# Patient Record
Sex: FEMALE | Race: WHITE | NOT HISPANIC OR LATINO | Employment: OTHER | URBAN - METROPOLITAN AREA
[De-identification: names, ages, dates, MRNs, and addresses within clinical notes are randomized per-mention and may not be internally consistent; named-entity substitution may affect disease eponyms.]

---

## 2017-05-14 LAB
ALBUMIN SERPL-MCNC: 4.4 G/DL (ref 3.5–5.5)
ALBUMIN/GLOB SERPL: 2 {RATIO} (ref 1.2–2.2)
ALP SERPL-CCNC: 93 IU/L (ref 39–117)
ALT SERPL-CCNC: 19 IU/L (ref 0–32)
AST SERPL-CCNC: 25 IU/L (ref 0–40)
BASOPHILS # BLD AUTO: 0.1 X10E3/UL (ref 0–0.2)
BASOPHILS NFR BLD AUTO: 1 %
BILIRUB SERPL-MCNC: 0.5 MG/DL (ref 0–1.2)
BUN SERPL-MCNC: 13 MG/DL (ref 6–24)
BUN/CREAT SERPL: 16 (ref 9–23)
CALCIUM SERPL-MCNC: 9.7 MG/DL (ref 8.7–10.2)
CHLORIDE SERPL-SCNC: 106 MMOL/L (ref 96–106)
CHOLEST SERPL-MCNC: 218 MG/DL (ref 100–199)
CO2 SERPL-SCNC: 24 MMOL/L (ref 18–29)
COMMENT 011824: ABNORMAL
CREAT SERPL-MCNC: 0.82 MG/DL (ref 0.57–1)
EOSINOPHIL # BLD AUTO: 0.4 X10E3/UL (ref 0–0.4)
EOSINOPHIL NFR BLD AUTO: 7 %
ERYTHROCYTE [DISTWIDTH] IN BLOOD BY AUTOMATED COUNT: 13.5 % (ref 12.3–15.4)
GLOBULIN SER CALC-MCNC: 2.2 G/DL (ref 1.5–4.5)
GLUCOSE SERPL-MCNC: 93 MG/DL (ref 65–99)
HCT VFR BLD AUTO: 43.3 % (ref 34–46.6)
HDLC SERPL-MCNC: 86 MG/DL
HGB BLD-MCNC: 14.6 G/DL (ref 11.1–15.9)
IMM GRANULOCYTES # BLD: 0 X10E3/UL (ref 0–0.1)
IMM GRANULOCYTES NFR BLD: 0 %
IMMATURE CELLS  115398: NORMAL
LDLC SERPL CALC-MCNC: 100 MG/DL (ref 0–99)
LYMPHOCYTES # BLD AUTO: 2.1 X10E3/UL (ref 0.7–3.1)
LYMPHOCYTES NFR BLD AUTO: 37 %
MCH RBC QN AUTO: 31.7 PG (ref 26.6–33)
MCHC RBC AUTO-ENTMCNC: 33.7 G/DL (ref 31.5–35.7)
MCV RBC AUTO: 94 FL (ref 79–97)
MONOCYTES # BLD AUTO: 0.4 X10E3/UL (ref 0.1–0.9)
MONOCYTES NFR BLD AUTO: 7 %
MORPHOLOGY BLD-IMP: NORMAL
NEUTROPHILS # BLD AUTO: 2.7 X10E3/UL (ref 1.4–7)
NEUTROPHILS NFR BLD AUTO: 48 %
NRBC BLD AUTO-RTO: NORMAL %
PLATELET # BLD AUTO: 302 X10E3/UL (ref 150–379)
POTASSIUM SERPL-SCNC: 4.7 MMOL/L (ref 3.5–5.2)
PROT SERPL-MCNC: 6.6 G/DL (ref 6–8.5)
RBC # BLD AUTO: 4.6 X10E6/UL (ref 3.77–5.28)
SODIUM SERPL-SCNC: 144 MMOL/L (ref 134–144)
TRIGL SERPL-MCNC: 161 MG/DL (ref 0–149)
VLDLC SERPL CALC-MCNC: 32 MG/DL (ref 5–40)
WBC # BLD AUTO: 5.7 X10E3/UL (ref 3.4–10.8)

## 2017-05-18 LAB — HEMOCCULT STL QL IA: NEGATIVE

## 2017-05-31 ENCOUNTER — OFFICE VISIT (OUTPATIENT)
Dept: MEDICAL GROUP | Age: 57
End: 2017-05-31
Payer: COMMERCIAL

## 2017-05-31 VITALS
BODY MASS INDEX: 26.29 KG/M2 | OXYGEN SATURATION: 97 % | HEART RATE: 71 BPM | SYSTOLIC BLOOD PRESSURE: 122 MMHG | WEIGHT: 154 LBS | TEMPERATURE: 99 F | DIASTOLIC BLOOD PRESSURE: 78 MMHG | HEIGHT: 64 IN

## 2017-05-31 DIAGNOSIS — K90.0 CELIAC DISEASE: ICD-10-CM

## 2017-05-31 DIAGNOSIS — Z12.31 ENCOUNTER FOR SCREENING MAMMOGRAM FOR BREAST CANCER: ICD-10-CM

## 2017-05-31 DIAGNOSIS — E78.2 MIXED HYPERLIPIDEMIA: ICD-10-CM

## 2017-05-31 PROCEDURE — 99214 OFFICE O/P EST MOD 30 MIN: CPT | Performed by: INTERNAL MEDICINE

## 2017-05-31 ASSESSMENT — ENCOUNTER SYMPTOMS
MUSCULOSKELETAL NEGATIVE: 1
RESPIRATORY NEGATIVE: 1
CONSTITUTIONAL NEGATIVE: 1
NEUROLOGICAL NEGATIVE: 1
CARDIOVASCULAR NEGATIVE: 1
GASTROINTESTINAL NEGATIVE: 1
PSYCHIATRIC NEGATIVE: 1
EYES NEGATIVE: 1

## 2017-05-31 ASSESSMENT — PATIENT HEALTH QUESTIONNAIRE - PHQ9: CLINICAL INTERPRETATION OF PHQ2 SCORE: 0

## 2017-05-31 NOTE — PROGRESS NOTES
"Subjective:      Isabella Strauss is a 56 y.o. female who presents with Celiac Disease  The patient is here for followup of chronic medical problems listed below. The patient is compliant with medications and having no side effects from them. Denies chest pain, abdominal pain, dyspnea, myalgias, or cough.   Patient Active Problem List    Diagnosis Date Noted   • Family history of colon cancer- in 2 great paternal uncles 05/23/2016   • Mixed hyperlipidemia- mild no meds; hdl> 60 05/23/2016   • Celiac disease 10/08/2013     Allergies   Allergen Reactions   • Ivp Dye [Iodine] Anaphylaxis     No outpatient prescriptions prior to visit.     No facility-administered medications prior to visit.               HPI    Review of Systems   Constitutional: Negative.    HENT: Negative.    Eyes: Negative.    Respiratory: Negative.    Cardiovascular: Negative.    Gastrointestinal: Negative.    Genitourinary: Negative.    Musculoskeletal: Negative.    Skin: Negative.    Neurological: Negative.    Endo/Heme/Allergies: Negative.    Psychiatric/Behavioral: Negative.           Objective:     /78 mmHg  Pulse 71  Temp(Src) 37.2 °C (99 °F)  Ht 1.638 m (5' 4.49\")  Wt 69.854 kg (154 lb)  BMI 26.04 kg/m2  SpO2 97%     Physical Exam   Constitutional: She is oriented to person, place, and time. She appears well-developed and well-nourished.   HENT:   Head: Normocephalic and atraumatic.   Right Ear: External ear normal.   Left Ear: External ear normal.   Nose: Nose normal.   Mouth/Throat: Oropharynx is clear and moist.   Eyes: Conjunctivae and EOM are normal. Pupils are equal, round, and reactive to light. Right eye exhibits no discharge. Left eye exhibits no discharge. No scleral icterus.   Neck: Normal range of motion. Neck supple. No JVD present. No tracheal deviation present. No thyromegaly present.   Cardiovascular: Normal rate, regular rhythm, normal heart sounds and intact distal pulses.  Exam reveals no gallop and no friction " rub.    Pulmonary/Chest: Effort normal and breath sounds normal. No stridor. No respiratory distress. She has no wheezes. She has no rales. She exhibits no tenderness.   Abdominal: Soft. Bowel sounds are normal. She exhibits no distension and no mass. There is no tenderness. There is no rebound and no guarding. No hernia.   Musculoskeletal: Normal range of motion. She exhibits no edema or tenderness.   Lymphadenopathy:     She has no cervical adenopathy.   Neurological: She is alert and oriented to person, place, and time. She has normal reflexes. She displays normal reflexes. No cranial nerve deficit. Coordination normal.   Skin: Skin is warm and dry. No rash noted. No erythema. No pallor.   Psychiatric: She has a normal mood and affect. Her behavior is normal. Judgment and thought content normal.   Nursing note and vitals reviewed.    Orders Only on 05/13/2017   Component Date Value   • WBC 05/13/2017 5.7    • RBC 05/13/2017 4.60    • Hemoglobin 05/13/2017 14.6    • Hematocrit 05/13/2017 43.3    • MCV 05/13/2017 94    • MCH 05/13/2017 31.7    • MCHC 05/13/2017 33.7    • RDW 05/13/2017 13.5    • Platelet Count 05/13/2017 302    • Neutrophils-Polys 05/13/2017 48    • Lymphocytes 05/13/2017 37    • Monocytes 05/13/2017 7    • Eosinophils 05/13/2017 7    • Basophils 05/13/2017 1    • Immature Cells 05/13/2017 CANCELED    • Neutrophils (Absolute) 05/13/2017 2.7    • Lymphs (Absolute) 05/13/2017 2.1    • Monos (Absolute) 05/13/2017 0.4    • Eos (Absolute) 05/13/2017 0.4    • Baso (Absolute) 05/13/2017 0.1    • Immature Granulocytes 05/13/2017 0    • Immature Granulocytes (a* 05/13/2017 0.0    • Nucleated RBC 05/13/2017 CANCELED    • Comments-Diff 05/13/2017 CANCELED    • Glucose 05/13/2017 93    • Bun 05/13/2017 13    • Creatinine 05/13/2017 0.82    • GFR If Non  Ameri* 05/13/2017 80    • GFR If  05/13/2017 93    • Bun-Creatinine Ratio 05/13/2017 16    • Sodium 05/13/2017 144    • Potassium  05/13/2017 4.7    • Chloride 05/13/2017 106    • Co2 05/13/2017 24    • Calcium 05/13/2017 9.7    • Total Protein 05/13/2017 6.6    • Albumin 05/13/2017 4.4    • Globulin 05/13/2017 2.2    • A-G Ratio 05/13/2017 2.0    • Total Bilirubin 05/13/2017 0.5    • Alkaline Phosphatase 05/13/2017 93    • AST(SGOT) 05/13/2017 25    • ALT(SGPT) 05/13/2017 19    • Cholesterol,Tot 05/13/2017 218*   • Triglycerides 05/13/2017 161*   • HDL 05/13/2017 86    • VLDL Cholesterol Calc 05/13/2017 32    • LDL 05/13/2017 100*   • Comment: 05/13/2017 CANCELED    • Occult Blood, IA 05/15/2017 Negative       No results found for: HBA1C  Lab Results   Component Value Date/Time    SODIUM 144 05/13/2017 07:25 AM    POTASSIUM 4.7 05/13/2017 07:25 AM    CHLORIDE 106 05/13/2017 07:25 AM    CO2 24 05/13/2017 07:25 AM    GLUCOSE 93 05/13/2017 07:25 AM    BUN 13 05/13/2017 07:25 AM    CREATININE 0.82 05/13/2017 07:25 AM    BUN-CREATININE RATIO 16 05/13/2017 07:25 AM    ALKALINE PHOSPHATASE 93 05/13/2017 07:25 AM    AST(SGOT) 25 05/13/2017 07:25 AM    ALT(SGPT) 19 05/13/2017 07:25 AM    TOTAL BILIRUBIN 0.5 05/13/2017 07:25 AM     No results found for: INR  Lab Results   Component Value Date/Time    CHOLESTEROL,* 05/13/2017 07:25 AM    * 05/13/2017 07:25 AM    HDL 86 05/13/2017 07:25 AM    TRIGLYCERIDES 161* 05/13/2017 07:25 AM       No results found for: TESTOSTERONE  No results found for: TSH  No results found for: FREET4  No results found for: URICACID  No components found for: VITB12  Lab Results   Component Value Date/Time    25-HYDROXY   VITAMIN D 25 40 10/09/2013 08:10 AM                    Assessment/Plan:     1. Mixed hyperlipidemia- mild no meds; hdl> 60 Under good control. Continue same regimen.     - COMP METABOLIC PANEL; Future  - LIPID PROFILE; Future  - CBC WITH DIFFERENTIAL; Future    2. Celiac disease Under good control. Continue same regimen. No iron def or anemia; no abd pains on gluten free diet       3. Encounter for  screening mammogram for breast cancer        - MA-SCREEN MAMMO W/CAD-BILAT    30 minute face-to-face encounter took place today.  More than half of this time was spent in the coordination of care of the above problems, as well as counseling.

## 2017-05-31 NOTE — MR AVS SNAPSHOT
"        Isabella Brann   2017 10:20 AM   Office Visit   MRN: [id number]    Department:  [address] [place] [address]   Dept Phone:  [phone]    Description:  Female : 1960   Provider:  Luis Frias M.D.           Reason for Visit     Celiac Disease lab review      Allergies as of 2017     Allergen Noted Reactions    Ivp Dye [Iodine] 10/08/2013   Anaphylaxis      You were diagnosed with     Mixed hyperlipidemia   [272.2.ICD-9-CM]       Celiac disease   [579.0.ICD-9-CM]       Encounter for screening mammogram for breast cancer   [4798950]         Vital Signs     Blood Pressure Pulse Temperature Height Weight Body Mass Index    122/78 mmHg 71 37.2 °C (99 °F) 1.638 m (5' 4.49\") 69.854 kg (154 lb) 26.04 kg/m2    Oxygen Saturation Smoking Status                97% Former Smoker          Basic Information     Date Of Birth Sex Race Ethnicity Preferred Language    1960 Female White Non- English      Your appointments     May 31, 2018  9:00 AM   ANNUAL EXAM PREVENTATIVE with Luis Frias M.D.   [address] [place]  [facility] [id number] [phone]              Problem List              ICD-10-CM Priority Class Noted - Resolved    Celiac disease K90.0   10/8/2013 - Present    Family history of colon cancer- in 2 great paternal uncles Z80.0   2016 - Present    Mixed hyperlipidemia- mild no meds; hdl> 60 E78.2   2016 - Present      Health Maintenance        Date Due Completion Dates    IMM DTaP/Tdap/Td Vaccine (1 - Tdap) 10/16/1979 ---    PAP SMEAR 10/16/1981 ---    MAMMOGRAM 2018, 2014    COLONOSCOPY 3/19/2020 3/19/2010            Current Immunizations     No immunizations on file.      Below and/or attached are the medications your provider expects you to take. Review all of your home medications and newly ordered medications with your provider and/or pharmacist. Follow medication instructions as directed by your " provider and/or pharmacist. Please keep your medication list with you and share with your provider. Update the information when medications are discontinued, doses are changed, or new medications (including over-the-counter products) are added; and carry medication information at all times in the event of emergency situations     Allergies:  IVP DYE - Anaphylaxis               Medications  Valid as of: May 31, 2017 - 10:59 AM    Generic Name Brand Name Tablet Size Instructions for use    .                 Medicines prescribed today were sent to:     [facility]/PHARMACY #[id number] - [place], [place] - [address]    [address] [place] [address]    Phone: [phone] Fax: [phone]    Open 24 Hours?: No      Medication refill instructions:       If your prescription bottle indicates you have medication refills left, it is not necessary to call your provider’s office. Please contact your pharmacy and they will refill your medication.    If your prescription bottle indicates you do not have any refills left, you may request refills at any time through one of the following ways: The online Scoreoid system (except Urgent Care), by calling your provider’s office, or by asking your pharmacy to contact your provider’s office with a refill request. Medication refills are processed only during regular business hours and may not be available until the next business day. Your provider may request additional information or to have a follow-up visit with you prior to refilling your medication.   *Please Note: Medication refills are assigned a new Rx number when refilled electronically. Your pharmacy may indicate that no refills were authorized even though a new prescription for the same medication is available at the pharmacy. Please request the medicine by name with the pharmacy before contacting your provider for a refill.        Your To Do List     Future Labs/Procedures Complete By Expires    CBC WITH DIFFERENTIAL  As directed  5/31/2018    COMP METABOLIC PANEL  As directed 5/31/2018    LIPID PROFILE  As directed 5/31/2018         Perk [employer] Access Code: [email or web address]  Expires: 6/30/2017 10:59 AM    Perk [employer]  A secure, online tool to manage your health information     [employer] Perk [employer]® is a secure, online tool that connects you to your personalized health information from the privacy of your home -- day or night - making it very easy for you to manage your healthcare. Once the activation process is completed, you can even access your medical information using the Perk Dynamics snehal, which is available for free in the Apple Snehal store or Google Play store.     Perk Dynamics provides the following levels of access (as shown below):   My Chart Features   [facility] Doctor [facility]  Urgent  Care Non-[facility]  Primary Care  Doctor   Email your healthcare team securely and privately 24/7 X X X    Manage appointments: schedule your next appointment; view details of past/upcoming appointments X      Request prescription refills. X      View recent personal medical records, including lab and immunizations X X X X   View health record, including health history, allergies, medications X X X X   Read reports about your outpatient visits, procedures, consult and ER notes X X X X   See your discharge summary, which is a recap of your hospital and/or ER visit that includes your diagnosis, lab results, and care plan. X X       How to register for Perk [employer]:  1. Go to  [email or web address].  2. Click on the Sign Up Now box, which takes you to the New Member Sign Up page. You will need to provide the following information:  a. Enter your Perk Dynamics Access Code exactly as it appears at the top of this page. (You will not need to use this code after you’ve completed the sign-up process. If you do not sign up before the expiration date, you must request a new code.)   b. Enter your date of birth.   c. Enter your home email address.    d. Click Submit, and follow the next screen’s instructions.  3. Create a Komli Mediat ID. This will be your [employer] login ID and cannot be changed, so think of one that is secure and easy to remember.  4. Create a [employer] password. You can change your password at any time.  5. Enter your Password Reset Question and Answer. This can be used at a later time if you forget your password.   6. Enter your e-mail address. This allows you to receive e-mail notifications when new information is available in Ridejoy.  7. Click Sign Up. You can now view your health information.    For assistance activating your [employer] account, call [phone]

## 2017-06-15 ENCOUNTER — OFFICE VISIT (OUTPATIENT)
Dept: MEDICAL GROUP | Age: 57
End: 2017-06-15
Payer: COMMERCIAL

## 2017-06-15 ENCOUNTER — HOSPITAL ENCOUNTER (OUTPATIENT)
Facility: MEDICAL CENTER | Age: 57
End: 2017-06-15
Attending: PHYSICIAN ASSISTANT
Payer: COMMERCIAL

## 2017-06-15 VITALS
HEIGHT: 65 IN | WEIGHT: 156.4 LBS | DIASTOLIC BLOOD PRESSURE: 78 MMHG | OXYGEN SATURATION: 97 % | BODY MASS INDEX: 26.06 KG/M2 | TEMPERATURE: 97.7 F | SYSTOLIC BLOOD PRESSURE: 110 MMHG | HEART RATE: 84 BPM

## 2017-06-15 DIAGNOSIS — Z11.51 SCREENING FOR HPV (HUMAN PAPILLOMAVIRUS): ICD-10-CM

## 2017-06-15 DIAGNOSIS — Z12.4 SCREENING FOR MALIGNANT NEOPLASM OF CERVIX: ICD-10-CM

## 2017-06-15 DIAGNOSIS — Z01.419 ENCOUNTER FOR GYNECOLOGICAL EXAMINATION: ICD-10-CM

## 2017-06-15 DIAGNOSIS — N95.2 VAGINAL ATROPHY: ICD-10-CM

## 2017-06-15 DIAGNOSIS — Z23 NEED FOR VACCINATION: ICD-10-CM

## 2017-06-15 PROCEDURE — 87624 HPV HI-RISK TYP POOLED RSLT: CPT

## 2017-06-15 PROCEDURE — 90715 TDAP VACCINE 7 YRS/> IM: CPT | Performed by: PHYSICIAN ASSISTANT

## 2017-06-15 PROCEDURE — 90736 HZV VACCINE LIVE SUBQ: CPT | Performed by: PHYSICIAN ASSISTANT

## 2017-06-15 PROCEDURE — 88175 CYTOPATH C/V AUTO FLUID REDO: CPT

## 2017-06-15 PROCEDURE — 90471 IMMUNIZATION ADMIN: CPT | Performed by: PHYSICIAN ASSISTANT

## 2017-06-15 PROCEDURE — 99396 PREV VISIT EST AGE 40-64: CPT | Mod: 25 | Performed by: PHYSICIAN ASSISTANT

## 2017-06-15 PROCEDURE — 90472 IMMUNIZATION ADMIN EACH ADD: CPT | Performed by: PHYSICIAN ASSISTANT

## 2017-06-15 NOTE — PROGRESS NOTES
"SUBJECTIVE:   Chief Complaint   Patient presents with   • Gynecologic Exam     PAP       56 y.o. female for annual routine gynecologic exam. She is new to me.    Obstetric History       T2      TAB0   SAB0   E0   M0   L2       History   Sexual Activity   • Sexual Activity:   • Partners: Male   • Birth Control/ Protection: Post-Menopausal       Last Pap: 3  H/O Abnormal Pap no  She has been menopausal since: 2009   She has not utilized HRT.    Her last Mammogram was done:  16  Reports no menopause symptoms of hot flashes, night sweats, sleep disruption, mood changes, vaginal dryness.   No significant bloating/fluid retention, pelvic pain, or dyspareunia. No vaginal discharge   She does perform regular self breast exams.  No breast tenderness, mass, nipple discharge, changes in size or contour, or abnormal cyclic discomfort    ROS:    No urinary tract symptoms, no incontinence.   No abdominal pain, change in bowel habits, black or bloody stools.    No unusual headaches, no visual changes.  No prolonged cough. No dyspnea or chest pain on exertion.  No depression, labile mood, anxiety ,libido changes, insomnia.  No new/concerning skin lesions, concerns.     Exercise: riding horses, clearning horse stalls (8) every day, walking > 10,000 steps \"Farm fit\"  Diet: reports was doing really well but has suffered some this morning.   Eating more fruit than veggies. Reports she has been on an icecream kick.     Social History   Substance Use Topics   • Smoking status: Former Smoker -- 2.00 packs/day for 10 years   • Smokeless tobacco: Never Used      Comment: quit smoking at 30 yrs old.   • Alcohol Use: 0.5 oz/week     1 Glasses of wine per week      Comment: once or twice a week        Patient Active Problem List    Diagnosis Date Noted   • Vaginal atrophy 06/15/2017   • Family history of colon cancer- in 2 great paternal uncles 2016   • Mixed hyperlipidemia- mild no meds; hdl> 60 2016 " "  • Celiac disease 10/08/2013       Past Medical History   Diagnosis Date   • Celiac disease      Past Surgical History   Procedure Laterality Date   • Pr reduction of large breast  2014   • Ovarian cystectomy     • Tonsillectomy and adenoidectomy     • Appendectomy     • Dental extraction(s)           Family History   Problem Relation Age of Onset   • Stroke Mother 58     multiple   • Hypertension Mother    • Hyperlipidemia Mother    • Cancer Father    • Lung Disease Father    • Stroke Maternal Grandmother 50   • Hyperlipidemia Maternal Grandmother    • Diabetes Maternal Grandfather    • Heart Disease Neg Hx      Family Status   Relation Status Death Age   • Mother     • Father           Current medicines (including changes today)  Current Outpatient Prescriptions   Medication Sig Dispense Refill   • aspirin EC (ECOTRIN) 81 MG Tablet Delayed Response Take 81 mg by mouth every day.       No current facility-administered medications for this visit.       LMP Date:  ()   Allergies: Ivp dye     Preventive Care:  Health Maintenance Topics with due status: Overdue       Topic Date Due    IMM DTaP/Tdap/Td Vaccine 10/16/1979    PAP SMEAR 10/16/1981       OBJECTIVE:   /78 mmHg  Pulse 84  Temp(Src) 36.5 °C (97.7 °F)  Ht 1.651 m (5' 5\")  Wt 70.943 kg (156 lb 6.4 oz)  BMI 26.03 kg/m2  SpO2 97%  Body mass index is 26.03 kg/(m^2).      Gen: Well developed, well nourished in no acute distress.   Skin: Pink, warm, and dry  HEENT: conjunctiva non-injected, sclera non-icteric. EOMs intact.   Nasal mucosa without edema nor erythema. No facial tenderness  Pinna normal. TM pearly gray.   Oral mucous membranes pink and moist with no lesions.  Neck: Supple, trachea midline. No adenopathy or masses in the neck or supraclavicular regions.  Lungs: Effort is normal. Clear to auscultation bilaterally with good excursion.  CV: regular rate and rhythm.  Abdomen: soft, nontender, + BS. No HSM.  No CVAT  Ext: " no edema, color normal, vascularity normal, temperature normal  Alert and oriented Eye contact is good, speech goal directed, affect calm     Breast Exam: Performed with instruction during examination. No axillary lymphadenopathy, no skin changes, no dominant masses. No nipple retraction  Pelvic Exam:  Normal external genitalia with no lesions. Pale vaginal mucosa. Cervix with no visible lesions. No cervical motion tenderness. Uterus is normal sized with no masses. No adnexal tenderness or enlargement appreciated. Pap is obtained and the specimen was sent to lab      <ASSESSMENT and PLAN>  1. Encounter for gynecological examination -Discussed  breast self exam, mammography screening, use and side effects of HRT, menopause, adequate intake of calcium and vitamin D, diet and exercise     2. Screening for malignant neoplasm of cervix -pap obtained and sent to lab. THINPREP PAP WITH HPV   3. Screening for HPV (human papillomavirus)  THINPREP PAP WITH HPV   4. Need for vaccination -VIS' given. Informed consent obtained. Vaccines administered in office.    Pt interested in receiving ZostaVax today. Advised she confirm coverage with her insurance prior to receiving. She would like to receive she reports that it is OK if not covered she will pay out of pocket. Advised it is approx $300-- she verbalizes agreement to go ahead with vaccine.   VARICELLA ZOSTER VACCINE SQ    Tdap =>6yo IM   5. Vaginal atrophy - discussed need for lubrication with intercourse. Discussed atrophic vaginitis at great length and intervention with premarin cream if symptoms worsen. Currently only has discomfort with intercourse and not using any lubrication.       Follow-up in 1 years for next Gyn exam and 3 years for next Pap. Otherwise follow-up with PCP as directed.

## 2017-06-15 NOTE — MR AVS SNAPSHOT
"        Isabella Brann   6/15/2017 9:10 AM   Office Visit   MRN: [id number]    Department:  [address] [place] [address]   Dept Phone:  [phone]    Description:  Female : 1960   Provider:  Suzanne Davidson PA-C           Reason for Visit     Gynecologic Exam PAP      Allergies as of 6/15/2017     Allergen Noted Reactions    Ivp Dye [Iodine] 10/08/2013   Anaphylaxis      You were diagnosed with     Encounter for gynecological examination   [9332622]       Screening for malignant neoplasm of cervix   [830602]       Screening for HPV (human papillomavirus)   [837058]       Need for vaccination   [490349]       Vaginal atrophy   [803330]         Vital Signs     Blood Pressure Pulse Temperature Height Weight Body Mass Index    110/78 mmHg 84 36.5 °C (97.7 °F) 1.651 m (5' 5\") 70.943 kg (156 lb 6.4 oz) 26.03 kg/m2    Oxygen Saturation Smoking Status                97% Former Smoker          Basic Information     Date Of Birth Sex Race Ethnicity Preferred Language    1960 Female White Non- English      Your appointments     May 31, 2018  9:00 AM   ANNUAL EXAM PREVENTATIVE with Luis Frias M.D.   [address])    [facility] [id number] [phone]              Problem List              ICD-10-CM Priority Class Noted - Resolved    Celiac disease K90.0   10/8/2013 - Present    Family history of colon cancer- in 2 great paternal uncles Z80.0   2016 - Present    Mixed hyperlipidemia- mild no meds; hdl> 60 E78.2   2016 - Present    Vaginal atrophy N95.2   6/15/2017 - Present      Health Maintenance        Date Due Completion Dates    IMM DTaP/Tdap/Td Vaccine (1 - Tdap) 10/16/1979 ---    PAP SMEAR 10/16/1981 ---    MAMMOGRAM 2018, 2014    COLONOSCOPY 3/19/2020 3/19/2010            Current Immunizations     SHINGLES VACCINE 6/15/2017 10:07 AM    Tdap Vaccine 6/15/2017 10:08 AM      Below and/or attached are the medications your provider " expects you to take. Review all of your home medications and newly ordered medications with your provider and/or pharmacist. Follow medication instructions as directed by your provider and/or pharmacist. Please keep your medication list with you and share with your provider. Update the information when medications are discontinued, doses are changed, or new medications (including over-the-counter products) are added; and carry medication information at all times in the event of emergency situations     Allergies:  IVP DYE - Anaphylaxis               Medications  Valid as of: Rosemary 15, 2017 - 10:09 AM    Generic Name Brand Name Tablet Size Instructions for use    Aspirin (Tablet Delayed Response) ECOTRIN 81 MG Take 81 mg by mouth every day.        .                 Medicines prescribed today were sent to:     [facility]/PHARMACY #[id number] - [place], [place] - [address]    [address] [place] [address]    Phone: [phone] Fax: [phone]    Open 24 Hours?: No      Medication refill instructions:       If your prescription bottle indicates you have medication refills left, it is not necessary to call your provider’s office. Please contact your pharmacy and they will refill your medication.    If your prescription bottle indicates you do not have any refills left, you may request refills at any time through one of the following ways: The online CardLab system (except Urgent Care), by calling your provider’s office, or by asking your pharmacy to contact your provider’s office with a refill request. Medication refills are processed only during regular business hours and may not be available until the next business day. Your provider may request additional information or to have a follow-up visit with you prior to refilling your medication.   *Please Note: Medication refills are assigned a new Rx number when refilled electronically. Your pharmacy may indicate that no refills were authorized even though a new  prescription for the same medication is available at the pharmacy. Please request the medicine by name with the pharmacy before contacting your provider for a refill.        Your To Do List     Future Labs/Procedures Complete By Expires    THINPREP PAP WITH HPV  As directed 6/15/2018         SparCode Access Code: Activation code not generated  Current SparCode Status: Active

## 2017-06-16 LAB
CYTOLOGY REG CYTOL: NORMAL
HPV HR 12 DNA CVX QL NAA+PROBE: NEGATIVE
HPV16 DNA SPEC QL NAA+PROBE: NEGATIVE
HPV18 DNA SPEC QL NAA+PROBE: NEGATIVE
SPECIMEN SOURCE: NORMAL

## 2017-10-24 ENCOUNTER — OFFICE VISIT (OUTPATIENT)
Dept: MEDICAL GROUP | Facility: MEDICAL CENTER | Age: 57
End: 2017-10-24
Payer: COMMERCIAL

## 2017-10-24 VITALS
SYSTOLIC BLOOD PRESSURE: 100 MMHG | OXYGEN SATURATION: 97 % | RESPIRATION RATE: 16 BRPM | DIASTOLIC BLOOD PRESSURE: 70 MMHG | HEART RATE: 54 BPM | WEIGHT: 158.4 LBS | TEMPERATURE: 97.3 F | BODY MASS INDEX: 26.39 KG/M2 | HEIGHT: 65 IN

## 2017-10-24 DIAGNOSIS — R42 DIZZINESS: ICD-10-CM

## 2017-10-24 PROCEDURE — 99214 OFFICE O/P EST MOD 30 MIN: CPT | Performed by: PHYSICIAN ASSISTANT

## 2017-10-24 RX ORDER — M-VIT,TX,IRON,MINS/CALC/FOLIC 27MG-0.4MG
1 TABLET ORAL DAILY
COMMUNITY

## 2017-10-24 RX ORDER — MECLIZINE HYDROCHLORIDE 25 MG/1
25 TABLET ORAL 3 TIMES DAILY PRN
Qty: 30 TAB | Refills: 0 | Status: SHIPPED | OUTPATIENT
Start: 2017-10-24 | End: 2018-05-31

## 2017-10-24 NOTE — ASSESSMENT & PLAN NOTE
This is a 57-year-old female who complains of a four-day history of a sensation of dizziness. Complains of pain in her head being foggy. She states it appears that she is pulling the right side when she walks. She has no history of hypertension. Does not have a history of chronic hyperlipidemia. Does have a history of having low blood pressure. She states that this morning she felt some nausea sickness but at cause no vomiting. It did resolve. She will return on an airplane about a week ago and thinks that maybe she has an inner ear issue. She denies that the room spins. She denies any changes in speech. She denies any recent head trauma. She denies any chest pain or shortness of breath. No tinnitus or hearing loss. No otalgia bilaterally. She also has looked online and thought maybe it wasn't vertigo that she had labyrinthitis. She states she is healthy. Tries not to go to the doctors except for preventative care. She currently lives alone as her  is out traveling. She does care for horses and other animals. She has an appointment tomorrow with ophthalmology.

## 2017-10-24 NOTE — PROGRESS NOTES
"Subjective:   Isabella Strauss is a 57 y.o. female here today for dizziness for 4 days.    Dizziness  This is a 57-year-old female who complains of a four-day history of a sensation of dizziness. Complains of pain in her head being foggy. She states it appears that she is pulling the right side when she walks. She has no history of hypertension. Does not have a history of chronic hyperlipidemia. Does have a history of having low blood pressure. She states that this morning she felt some nausea sickness but at cause no vomiting. It did resolve. She will return on an airplane about a week ago and thinks that maybe she has an inner ear issue. She denies that the room spins. She denies any changes in speech. She denies any recent head trauma. She denies any chest pain or shortness of breath. No tinnitus or hearing loss. No otalgia bilaterally. She also has looked online and thought maybe it wasn't vertigo that she had labyrinthitis. She states she is healthy. Tries not to go to the doctors except for preventative care. She currently lives alone as her  is out traveling. She does care for horses and other animals.       Current medicines (including changes today)  Current Outpatient Prescriptions   Medication Sig Dispense Refill   • therapeutic multivitamin-minerals (THERAGRAN-M) Tab Take 1 Tab by mouth every day.     • meclizine (ANTIVERT) 25 MG Tab Take 1 Tab by mouth 3 times a day as needed. 30 Tab 0   • aspirin EC (ECOTRIN) 81 MG Tablet Delayed Response Take 81 mg by mouth every day.       No current facility-administered medications for this visit.      She  has a past medical history of Celiac disease.    ROS   No chest pain, no shortness of breath, no abdominal pain and all other systems were reviewed and are negative.       Objective:     Blood pressure 100/70, pulse (!) 54, temperature 36.3 °C (97.3 °F), resp. rate 16, height 1.651 m (5' 5\"), weight 71.8 kg (158 lb 6.4 oz), SpO2 97 %. Body mass index is 26.36 " kg/m².   Physical Exam:  Constitutional: Alert, no distress.  Skin: Warm, dry, good turgor, no rashes in visible areas.  Eye: Equal, round and reactive, conjunctiva clear, lids normal. No nystagmus noted.  ENMT: Lips without lesions, good dentition, oropharynx clear. TMs bilaterally are clear.  Neck: Trachea midline, no masses.   Lymph: No cervical or supraclavicular lymphadenopathy  Respiratory: Unlabored respiratory effort, lungs clear to auscultation, no wheezes, no ronchi.  Cardiovascular: Normal S1, S2, no murmur, no edema.  Abdomen: Soft, non-tender, no masses. No bruits.  Neuro: CN II through XII intact. DTRs patella 2+ bilaterally. Afton-Hallpike test is negative. Head thrust test is negative.  Musculoskeletal: Muscle strength upper and lower extremities bilaterally at 5 out of 5. Range of motion is good. Gait appears unsteady. Romberg test is negative.  Psych: Alert and oriented x3, normal affect and mood.        Assessment and Plan:   The following treatment plan was discussed    1. Dizziness  Acute, new onset condition. Not sure if she has some form of vestibular dysfunction. Neurological exam was unremarkable. I would rule out some stroke cause. Provided meclizine to take as directed. Advised if she develops any worsening symptoms she is to go to the ED. She may contact me in 2 days to discuss symptoms if there are any changes. Currently only symptom she has is a pulling of the right side when she walks. Advised not to exert herself. Pulse is low today but I doubt it's secondary to some orthostatic blood pressure changes because she denies any fatigue or lightheadedness with positional changes. Follow-up with ophthalmology tomorrow.  - meclizine (ANTIVERT) 25 MG Tab; Take 1 Tab by mouth 3 times a day as needed.  Dispense: 30 Tab; Refill: 0      Followup: Return if symptoms worsen or fail to improve.    Please note that this dictation was created using voice recognition software. I have made every  reasonable attempt to correct obvious errors, but I expect that there are errors of grammar and possibly content that I did not discover before finalizing the note.

## 2017-10-26 ENCOUNTER — HOSPITAL ENCOUNTER (OUTPATIENT)
Dept: LAB | Facility: MEDICAL CENTER | Age: 57
End: 2017-10-26
Attending: INTERNAL MEDICINE
Payer: COMMERCIAL

## 2017-10-26 ENCOUNTER — OFFICE VISIT (OUTPATIENT)
Dept: MEDICAL GROUP | Age: 57
End: 2017-10-26
Payer: COMMERCIAL

## 2017-10-26 VITALS
HEART RATE: 87 BPM | OXYGEN SATURATION: 96 % | TEMPERATURE: 98.2 F | BODY MASS INDEX: 26.16 KG/M2 | DIASTOLIC BLOOD PRESSURE: 90 MMHG | WEIGHT: 157 LBS | SYSTOLIC BLOOD PRESSURE: 146 MMHG | HEIGHT: 65 IN

## 2017-10-26 DIAGNOSIS — R42 DIZZINESS: ICD-10-CM

## 2017-10-26 DIAGNOSIS — K21.00 GASTROESOPHAGEAL REFLUX DISEASE WITH ESOPHAGITIS: ICD-10-CM

## 2017-10-26 DIAGNOSIS — G44.52 NEW DAILY PERSISTENT HEADACHE: ICD-10-CM

## 2017-10-26 LAB
ALBUMIN SERPL BCP-MCNC: 4.3 G/DL (ref 3.2–4.9)
ALBUMIN/GLOB SERPL: 1.6 G/DL
ALP SERPL-CCNC: 94 U/L (ref 30–99)
ALT SERPL-CCNC: 15 U/L (ref 2–50)
ANION GAP SERPL CALC-SCNC: 8 MMOL/L (ref 0–11.9)
AST SERPL-CCNC: 19 U/L (ref 12–45)
BASOPHILS # BLD AUTO: 1.1 % (ref 0–1.8)
BASOPHILS # BLD: 0.07 K/UL (ref 0–0.12)
BILIRUB SERPL-MCNC: 0.6 MG/DL (ref 0.1–1.5)
BUN SERPL-MCNC: 14 MG/DL (ref 8–22)
CALCIUM SERPL-MCNC: 9.8 MG/DL (ref 8.5–10.5)
CHLORIDE SERPL-SCNC: 107 MMOL/L (ref 96–112)
CO2 SERPL-SCNC: 27 MMOL/L (ref 20–33)
CREAT SERPL-MCNC: 0.78 MG/DL (ref 0.5–1.4)
EOSINOPHIL # BLD AUTO: 0.28 K/UL (ref 0–0.51)
EOSINOPHIL NFR BLD: 4.3 % (ref 0–6.9)
ERYTHROCYTE [DISTWIDTH] IN BLOOD BY AUTOMATED COUNT: 45.8 FL (ref 35.9–50)
ERYTHROCYTE [SEDIMENTATION RATE] IN BLOOD BY WESTERGREN METHOD: 9 MM/HOUR (ref 0–30)
GFR SERPL CREATININE-BSD FRML MDRD: >60 ML/MIN/1.73 M 2
GLOBULIN SER CALC-MCNC: 2.7 G/DL (ref 1.9–3.5)
GLUCOSE SERPL-MCNC: 87 MG/DL (ref 65–99)
HCT VFR BLD AUTO: 46.1 % (ref 37–47)
HGB BLD-MCNC: 15 G/DL (ref 12–16)
IMM GRANULOCYTES # BLD AUTO: 0.02 K/UL (ref 0–0.11)
IMM GRANULOCYTES NFR BLD AUTO: 0.3 % (ref 0–0.9)
LYMPHOCYTES # BLD AUTO: 1.67 K/UL (ref 1–4.8)
LYMPHOCYTES NFR BLD: 25.5 % (ref 22–41)
MCH RBC QN AUTO: 30.7 PG (ref 27–33)
MCHC RBC AUTO-ENTMCNC: 32.5 G/DL (ref 33.6–35)
MCV RBC AUTO: 94.5 FL (ref 81.4–97.8)
MONOCYTES # BLD AUTO: 0.42 K/UL (ref 0–0.85)
MONOCYTES NFR BLD AUTO: 6.4 % (ref 0–13.4)
NEUTROPHILS # BLD AUTO: 4.1 K/UL (ref 2–7.15)
NEUTROPHILS NFR BLD: 62.4 % (ref 44–72)
NRBC # BLD AUTO: 0 K/UL
NRBC BLD AUTO-RTO: 0 /100 WBC
PLATELET # BLD AUTO: 329 K/UL (ref 164–446)
PMV BLD AUTO: 9.9 FL (ref 9–12.9)
POTASSIUM SERPL-SCNC: 4.8 MMOL/L (ref 3.6–5.5)
PROT SERPL-MCNC: 7 G/DL (ref 6–8.2)
RBC # BLD AUTO: 4.88 M/UL (ref 4.2–5.4)
SODIUM SERPL-SCNC: 142 MMOL/L (ref 135–145)
WBC # BLD AUTO: 6.6 K/UL (ref 4.8–10.8)

## 2017-10-26 PROCEDURE — 85025 COMPLETE CBC W/AUTO DIFF WBC: CPT

## 2017-10-26 PROCEDURE — 99214 OFFICE O/P EST MOD 30 MIN: CPT | Performed by: INTERNAL MEDICINE

## 2017-10-26 PROCEDURE — 80053 COMPREHEN METABOLIC PANEL: CPT

## 2017-10-26 PROCEDURE — 85652 RBC SED RATE AUTOMATED: CPT

## 2017-10-26 PROCEDURE — 36415 COLL VENOUS BLD VENIPUNCTURE: CPT

## 2017-10-26 ASSESSMENT — ENCOUNTER SYMPTOMS
CARDIOVASCULAR NEGATIVE: 1
GASTROINTESTINAL NEGATIVE: 1
RESPIRATORY NEGATIVE: 1
EYES NEGATIVE: 1
NEUROLOGICAL NEGATIVE: 1
MUSCULOSKELETAL NEGATIVE: 1
CONSTITUTIONAL NEGATIVE: 1
PSYCHIATRIC NEGATIVE: 1

## 2017-10-26 NOTE — PROGRESS NOTES
"Subjective:      Isabella Strauss is a 57 y.o. female who presents with Dizziness (\" I feel like I am drunk all the time, not dizzy\" x 4 days )    This patient presents with a four-day history of lightheadedness feeling like she's disoriented with a mild left-sided headache. It's a pressure aching-like headache. Not throbbing stabbing or pounding. Gradual onset. No social nausea or vomiting. No visual disturbance. No vertigo. No fever chills or sore throat or cough. No abdominal pain diarrhea constipation. No prior history of this. No medications. Was seen by another provider to days ago for the same symptoms and was prescribed meclizine. She sees any worse since taking that. Denies any chest pain or arm pain and back pain.    Meds none     allergies none    History of migraines. There is no history of migraines.            HPI    Review of Systems   Constitutional: Negative.    HENT: Negative.    Eyes: Negative.    Respiratory: Negative.    Cardiovascular: Negative.    Gastrointestinal: Negative.    Genitourinary: Negative.    Musculoskeletal: Negative.    Skin: Negative.    Neurological: Negative.    Endo/Heme/Allergies: Negative.    Psychiatric/Behavioral: Negative.           Objective:     /90   Pulse 87   Temp 36.8 °C (98.2 °F)   Ht 1.651 m (5' 5\")   Wt 71.2 kg (157 lb)   SpO2 96%   BMI 26.13 kg/m²      Physical Exam   Constitutional: She is oriented to person, place, and time. She appears well-developed and well-nourished. No distress.   HENT:   Head: Normocephalic and atraumatic.   Right Ear: External ear normal.   Left Ear: External ear normal.   Nose: Nose normal.   Mouth/Throat: Oropharynx is clear and moist. No oropharyngeal exudate.   Eyes: Conjunctivae and EOM are normal. Pupils are equal, round, and reactive to light. Right eye exhibits no discharge. Left eye exhibits no discharge. No scleral icterus.   Neck: Normal range of motion. Neck supple. No JVD present. No tracheal deviation present. No " thyromegaly present.   Cardiovascular: Normal rate, regular rhythm, normal heart sounds and intact distal pulses.  Exam reveals no gallop and no friction rub.    No murmur heard.  Pulmonary/Chest: Effort normal and breath sounds normal. No stridor. No respiratory distress. She has no wheezes. She has no rales. She exhibits no tenderness.   Abdominal: Soft. Bowel sounds are normal. She exhibits no distension and no mass. There is no tenderness. There is no rebound and no guarding.   Musculoskeletal: Normal range of motion. She exhibits no edema or tenderness.   Lymphadenopathy:     She has no cervical adenopathy.   Neurological: She is alert and oriented to person, place, and time. She has normal reflexes. She displays normal reflexes. No cranial nerve deficit. She exhibits normal muscle tone. Coordination normal.   Skin: Skin is warm and dry. No rash noted. She is not diaphoretic. No erythema. No pallor.   Psychiatric: She has a normal mood and affect. Her behavior is normal. Judgment and thought content normal.   Vitals reviewed.    No visits with results within 1 Month(s) from this visit.   Latest known visit with results is:   Hospital Outpatient Visit on 06/15/2017   Component Date Value   • Cytology Reg 06/16/2017 See Path Report    • Source 06/16/2017 Endo/Cervical    • HPV Genotype 16 06/16/2017 Negative    • HPV Genotype 18 06/16/2017 Negative    • HPV Other High Risk Amena* 06/16/2017 Negative       No results found for: HBA1C  Lab Results   Component Value Date/Time    SODIUM 144 05/13/2017 07:25 AM    SODIUM 140 05/09/2016 07:14 AM    POTASSIUM 4.7 05/13/2017 07:25 AM    POTASSIUM 4.2 05/09/2016 07:14 AM    CHLORIDE 106 05/13/2017 07:25 AM    CHLORIDE 110 05/09/2016 07:14 AM    CO2 24 05/13/2017 07:25 AM    CO2 23 05/09/2016 07:14 AM    GLUCOSE 93 05/13/2017 07:25 AM    GLUCOSE 83 05/09/2016 07:14 AM    BUN 13 05/13/2017 07:25 AM    BUN 18 05/09/2016 07:14 AM    CREATININE 0.82 05/13/2017 07:25 AM     CREATININE 0.73 05/09/2016 07:14 AM    BUNCREATRAT 16 05/13/2017 07:25 AM    ALKPHOSPHAT 93 05/13/2017 07:25 AM    ALKPHOSPHAT 83 05/09/2016 07:14 AM    ASTSGOT 25 05/13/2017 07:25 AM    ASTSGOT 20 05/09/2016 07:14 AM    ALTSGPT 19 05/13/2017 07:25 AM    ALTSGPT 15 05/09/2016 07:14 AM    TBILIRUBIN 0.5 05/13/2017 07:25 AM    TBILIRUBIN 0.6 05/09/2016 07:14 AM     No results found for: INR  Lab Results   Component Value Date/Time    CHOLSTRLTOT 218 (H) 05/13/2017 07:25 AM    CHOLSTRLTOT 190 05/09/2016 07:14 AM     (H) 05/13/2017 07:25 AM     (H) 05/09/2016 07:14 AM    HDL 86 05/13/2017 07:25 AM    HDL 74 05/09/2016 07:14 AM    TRIGLYCERIDE 161 (H) 05/13/2017 07:25 AM    TRIGLYCERIDE 75 05/09/2016 07:14 AM       No results found for: TESTOSTERONE  No results found for: TSH  No results found for: FREET4  No results found for: URICACID  No components found for: VITB12  Lab Results   Component Value Date/Time    25HYDROXY 40 10/09/2013 08:10 AM                 Assessment/Plan:     1. Dizziness-For a history suggestive of possible viral syndrome. Rule out acute subarachnoid hemorrhage. We'll get CT of the head without contrast immediately and call results. Otherwise check screening lab work. No specific treatment for now. The risks and fluids. If symptoms worsen or she develop any chest pain or severe nausea or worsening dizziness. She will emergency room immediately.         - CT-HEAD W/O; Future  - CBC WITH DIFFERENTIAL; Future  - WESTERGREN SED RATE; Future  - COMP METABOLIC PANEL; Future    2. New daily persistent headache    -As above.  - CT-HEAD W/O; Future  - CBC WITH DIFFERENTIAL; Future  - WESTERGREN SED RATE; Future  - COMP METABOLIC PANEL; Future    3. Gastroesophageal reflux disease with esophagitis  PPI when necessary.  - COMP METABOLIC PANEL; Future    30 minute face-to-face encounter took place today.  More than half of this time was spent in the coordination of care of the above problems, as  well as counseling.

## 2017-10-30 ENCOUNTER — HOSPITAL ENCOUNTER (OUTPATIENT)
Dept: RADIOLOGY | Facility: MEDICAL CENTER | Age: 57
End: 2017-10-30
Attending: INTERNAL MEDICINE
Payer: COMMERCIAL

## 2017-10-30 DIAGNOSIS — R42 DIZZINESS: ICD-10-CM

## 2017-10-30 DIAGNOSIS — G44.52 NEW DAILY PERSISTENT HEADACHE: ICD-10-CM

## 2017-10-30 PROCEDURE — 70450 CT HEAD/BRAIN W/O DYE: CPT

## 2017-10-31 PROCEDURE — 70450 CT HEAD/BRAIN W/O DYE: CPT

## 2017-11-02 ENCOUNTER — OFFICE VISIT (OUTPATIENT)
Dept: MEDICAL GROUP | Age: 57
End: 2017-11-02
Payer: COMMERCIAL

## 2017-11-02 VITALS
OXYGEN SATURATION: 99 % | HEART RATE: 80 BPM | RESPIRATION RATE: 16 BRPM | TEMPERATURE: 96.8 F | HEIGHT: 65 IN | SYSTOLIC BLOOD PRESSURE: 142 MMHG | DIASTOLIC BLOOD PRESSURE: 82 MMHG | WEIGHT: 158 LBS | BODY MASS INDEX: 26.33 KG/M2

## 2017-11-02 DIAGNOSIS — R42 DIZZINESS: ICD-10-CM

## 2017-11-02 DIAGNOSIS — E78.2 MIXED HYPERLIPIDEMIA: ICD-10-CM

## 2017-11-02 DIAGNOSIS — K21.00 GASTROESOPHAGEAL REFLUX DISEASE WITH ESOPHAGITIS: ICD-10-CM

## 2017-11-02 DIAGNOSIS — G44.52 NEW DAILY PERSISTENT HEADACHE: ICD-10-CM

## 2017-11-02 DIAGNOSIS — I10 ESSENTIAL HYPERTENSION: ICD-10-CM

## 2017-11-02 PROCEDURE — 99214 OFFICE O/P EST MOD 30 MIN: CPT | Performed by: INTERNAL MEDICINE

## 2017-11-02 RX ORDER — LISINOPRIL 10 MG/1
10 TABLET ORAL DAILY
Qty: 90 TAB | Refills: 4 | Status: SHIPPED | OUTPATIENT
Start: 2017-11-02 | End: 2019-02-05

## 2017-11-02 ASSESSMENT — ENCOUNTER SYMPTOMS
EYES NEGATIVE: 1
RESPIRATORY NEGATIVE: 1
CONSTITUTIONAL NEGATIVE: 1
CARDIOVASCULAR NEGATIVE: 1
PSYCHIATRIC NEGATIVE: 1
NEUROLOGICAL NEGATIVE: 1
GASTROINTESTINAL NEGATIVE: 1
MUSCULOSKELETAL NEGATIVE: 1

## 2017-11-02 ASSESSMENT — PAIN SCALES - GENERAL: PAINLEVEL: NO PAIN

## 2017-11-02 NOTE — PROGRESS NOTES
"Subjective:      Isabella Strauss is a 57 y.o. female who presents with Dizziness (I week follow up)  And also here for follow-up of new onset of hypertension. She's still having mild headache but this is much better. She's no longer having extreme dizziness except for certain changes in position. No true vertigo. And  The patient is here for followup of chronic medical problems listed below. The patient is compliant with medications and having no side effects from them. Denies chest pain, abdominal pain, dyspnea, myalgias, or cough.     Patient Active Problem List    Diagnosis Date Noted   • Essential hypertension 11/02/2017   • New daily persistent headache 10/26/2017   • Gastroesophageal reflux disease with esophagitis 10/26/2017   • Dizziness 10/24/2017   • Vaginal atrophy 06/15/2017   • Family history of colon cancer- in 2 great paternal uncles 05/23/2016   • Mixed hyperlipidemia- mild no meds; hdl> 60 05/23/2016   • Celiac disease 10/08/2013     Allergies   Allergen Reactions   • Ivp Dye [Iodine] Anaphylaxis     Outpatient Medications Prior to Visit   Medication Sig Dispense Refill   • therapeutic multivitamin-minerals (THERAGRAN-M) Tab Take 1 Tab by mouth every day.     • aspirin EC (ECOTRIN) 81 MG Tablet Delayed Response Take 81 mg by mouth every day.     • meclizine (ANTIVERT) 25 MG Tab Take 1 Tab by mouth 3 times a day as needed. 30 Tab 0     No facility-administered medications prior to visit.                    HPI    Review of Systems   Constitutional: Negative.    HENT: Negative.    Eyes: Negative.    Respiratory: Negative.    Cardiovascular: Negative.    Gastrointestinal: Negative.    Genitourinary: Negative.    Musculoskeletal: Negative.    Skin: Negative.    Neurological: Negative.    Endo/Heme/Allergies: Negative.    Psychiatric/Behavioral: Negative.           Objective:     /82   Pulse 80   Temp 36 °C (96.8 °F)   Resp 16   Ht 1.651 m (5' 5\")   Wt 71.7 kg (158 lb)   SpO2 99%   BMI 26.29 " kg/m²      Physical Exam   Constitutional: She is oriented to person, place, and time. She appears well-developed and well-nourished. No distress.   HENT:   Head: Normocephalic and atraumatic.   Right Ear: External ear normal.   Left Ear: External ear normal.   Nose: Nose normal.   Mouth/Throat: Oropharynx is clear and moist. No oropharyngeal exudate.   Eyes: Conjunctivae and EOM are normal. Pupils are equal, round, and reactive to light. Right eye exhibits no discharge. Left eye exhibits no discharge. No scleral icterus.   Neck: Normal range of motion. Neck supple. No JVD present. No tracheal deviation present. No thyromegaly present.   Cardiovascular: Normal rate, regular rhythm, normal heart sounds and intact distal pulses.  Exam reveals no gallop and no friction rub.    No murmur heard.  Pulmonary/Chest: Effort normal and breath sounds normal. No stridor. No respiratory distress. She has no wheezes. She has no rales. She exhibits no tenderness.   Abdominal: Soft. Bowel sounds are normal. She exhibits no distension and no mass. There is no tenderness. There is no rebound and no guarding.   Musculoskeletal: Normal range of motion. She exhibits no edema or tenderness.   Lymphadenopathy:     She has no cervical adenopathy.   Neurological: She is alert and oriented to person, place, and time. She has normal reflexes. She displays normal reflexes. No cranial nerve deficit. She exhibits normal muscle tone. Coordination normal.   Skin: Skin is warm and dry. No rash noted. She is not diaphoretic. No erythema. No pallor.   Psychiatric: She has a normal mood and affect. Her behavior is normal. Judgment and thought content normal.   Vitals reviewed.         Hospital Outpatient Visit on 10/26/2017   Component Date Value   • WBC 10/26/2017 6.6    • RBC 10/26/2017 4.88    • Hemoglobin 10/26/2017 15.0    • Hematocrit 10/26/2017 46.1    • MCV 10/26/2017 94.5    • MCH 10/26/2017 30.7    • MCHC 10/26/2017 32.5*   • RDW 10/26/2017  45.8    • Platelet Count 10/26/2017 329    • MPV 10/26/2017 9.9    • Neutrophils-Polys 10/26/2017 62.40    • Lymphocytes 10/26/2017 25.50    • Monocytes 10/26/2017 6.40    • Eosinophils 10/26/2017 4.30    • Basophils 10/26/2017 1.10    • Immature Granulocytes 10/26/2017 0.30    • Nucleated RBC 10/26/2017 0.00    • Neutrophils (Absolute) 10/26/2017 4.10    • Lymphs (Absolute) 10/26/2017 1.67    • Monos (Absolute) 10/26/2017 0.42    • Eos (Absolute) 10/26/2017 0.28    • Baso (Absolute) 10/26/2017 0.07    • Immature Granulocytes (a* 10/26/2017 0.02    • NRBC (Absolute) 10/26/2017 0.00    • Sed Rate Westergren 10/26/2017 9    • Sodium 10/26/2017 142    • Potassium 10/26/2017 4.8    • Chloride 10/26/2017 107    • Co2 10/26/2017 27    • Anion Gap 10/26/2017 8.0    • Glucose 10/26/2017 87    • Bun 10/26/2017 14    • Creatinine 10/26/2017 0.78    • Calcium 10/26/2017 9.8    • AST(SGOT) 10/26/2017 19    • ALT(SGPT) 10/26/2017 15    • Alkaline Phosphatase 10/26/2017 94    • Total Bilirubin 10/26/2017 0.6    • Albumin 10/26/2017 4.3    • Total Protein 10/26/2017 7.0    • Globulin 10/26/2017 2.7    • A-G Ratio 10/26/2017 1.6    • GFR If  10/26/2017 >60    • GFR If Non  Ameri* 10/26/2017 >60       No results found for: HBA1C  Lab Results   Component Value Date/Time    SODIUM 142 10/26/2017 11:17 AM    POTASSIUM 4.8 10/26/2017 11:17 AM    CHLORIDE 107 10/26/2017 11:17 AM    CO2 27 10/26/2017 11:17 AM    GLUCOSE 87 10/26/2017 11:17 AM    BUN 14 10/26/2017 11:17 AM    CREATININE 0.78 10/26/2017 11:17 AM    BUNCREATRAT 16 05/13/2017 07:25 AM    ALKPHOSPHAT 94 10/26/2017 11:17 AM    ASTSGOT 19 10/26/2017 11:17 AM    ALTSGPT 15 10/26/2017 11:17 AM    TBILIRUBIN 0.6 10/26/2017 11:17 AM     No results found for: INR  Lab Results   Component Value Date/Time    CHOLSTRLTOT 218 (H) 05/13/2017 07:25 AM    CHOLSTRLTOT 190 05/09/2016 07:14 AM     (H) 05/13/2017 07:25 AM     (H) 05/09/2016 07:14 AM    HDL  86 05/13/2017 07:25 AM    HDL 74 05/09/2016 07:14 AM    TRIGLYCERIDE 161 (H) 05/13/2017 07:25 AM    TRIGLYCERIDE 75 05/09/2016 07:14 AM       No results found for: TESTOSTERONE  No results found for: TSH  No results found for: FREET4  No results found for: URICACID  No components found for: VITB12  Lab Results   Component Value Date/Time    25HYDROXY 40 10/09/2013 08:10 AM          Assessment/Plan:     1. Essential hypertension-  new problem-Start lisinopril. Recheck in one month to assess response  - lisinopril (PRINIVIL) 10 MG Tab; Take 1 Tab by mouth every day.  Dispense: 90 Tab; Refill: 4    2. New daily persistent headache    -This is much improved. Possible secondary to new onset hypertension versus viral syndrome. CT of head was negative without contrast. If headache persists will proceed with an MRI with contrast.    3. Dizziness-as above. Much improved.       4. Mixed hyperlipidemia- mild no meds; hdl> 60     Under good control. Continue same regimen.    5. Gastroesophageal reflux disease with esophagitis   Under good control. Continue same regimen.      30 minute face-to-face encounter took place today.  More than half of this time was spent in the coordination of care of the above problems, as well as counseling.

## 2017-12-11 ENCOUNTER — APPOINTMENT (OUTPATIENT)
Dept: MEDICAL GROUP | Age: 57
End: 2017-12-11
Payer: COMMERCIAL

## 2018-02-09 ENCOUNTER — HOSPITAL ENCOUNTER (OUTPATIENT)
Dept: RADIOLOGY | Facility: MEDICAL CENTER | Age: 58
End: 2018-02-09
Attending: INTERNAL MEDICINE
Payer: COMMERCIAL

## 2018-02-09 PROCEDURE — 77067 SCR MAMMO BI INCL CAD: CPT

## 2018-05-04 ENCOUNTER — HOSPITAL ENCOUNTER (OUTPATIENT)
Dept: LAB | Facility: MEDICAL CENTER | Age: 58
End: 2018-05-04
Attending: INTERNAL MEDICINE
Payer: COMMERCIAL

## 2018-05-04 DIAGNOSIS — E78.2 MIXED HYPERLIPIDEMIA: ICD-10-CM

## 2018-05-04 LAB
ALBUMIN SERPL BCP-MCNC: 4.1 G/DL (ref 3.2–4.9)
ALBUMIN/GLOB SERPL: 1.6 G/DL
ALP SERPL-CCNC: 91 U/L (ref 30–99)
ALT SERPL-CCNC: 15 U/L (ref 2–50)
ANION GAP SERPL CALC-SCNC: 6 MMOL/L (ref 0–11.9)
AST SERPL-CCNC: 21 U/L (ref 12–45)
BASOPHILS # BLD AUTO: 1.3 % (ref 0–1.8)
BASOPHILS # BLD: 0.06 K/UL (ref 0–0.12)
BILIRUB SERPL-MCNC: 0.9 MG/DL (ref 0.1–1.5)
BUN SERPL-MCNC: 15 MG/DL (ref 8–22)
CALCIUM SERPL-MCNC: 9.8 MG/DL (ref 8.5–10.5)
CHLORIDE SERPL-SCNC: 108 MMOL/L (ref 96–112)
CHOLEST SERPL-MCNC: 154 MG/DL (ref 100–199)
CO2 SERPL-SCNC: 28 MMOL/L (ref 20–33)
CREAT SERPL-MCNC: 0.8 MG/DL (ref 0.5–1.4)
EOSINOPHIL # BLD AUTO: 0.3 K/UL (ref 0–0.51)
EOSINOPHIL NFR BLD: 6.7 % (ref 0–6.9)
ERYTHROCYTE [DISTWIDTH] IN BLOOD BY AUTOMATED COUNT: 44 FL (ref 35.9–50)
GLOBULIN SER CALC-MCNC: 2.5 G/DL (ref 1.9–3.5)
GLUCOSE SERPL-MCNC: 89 MG/DL (ref 65–99)
HCT VFR BLD AUTO: 43.7 % (ref 37–47)
HDLC SERPL-MCNC: 70 MG/DL
HGB BLD-MCNC: 14.1 G/DL (ref 12–16)
IMM GRANULOCYTES # BLD AUTO: 0 K/UL (ref 0–0.11)
IMM GRANULOCYTES NFR BLD AUTO: 0 % (ref 0–0.9)
LDLC SERPL CALC-MCNC: 63 MG/DL
LYMPHOCYTES # BLD AUTO: 1.48 K/UL (ref 1–4.8)
LYMPHOCYTES NFR BLD: 33 % (ref 22–41)
MCH RBC QN AUTO: 30.1 PG (ref 27–33)
MCHC RBC AUTO-ENTMCNC: 32.3 G/DL (ref 33.6–35)
MCV RBC AUTO: 93.4 FL (ref 81.4–97.8)
MONOCYTES # BLD AUTO: 0.39 K/UL (ref 0–0.85)
MONOCYTES NFR BLD AUTO: 8.7 % (ref 0–13.4)
NEUTROPHILS # BLD AUTO: 2.25 K/UL (ref 2–7.15)
NEUTROPHILS NFR BLD: 50.3 % (ref 44–72)
NRBC # BLD AUTO: 0 K/UL
NRBC BLD-RTO: 0 /100 WBC
PLATELET # BLD AUTO: 302 K/UL (ref 164–446)
PMV BLD AUTO: 9.6 FL (ref 9–12.9)
POTASSIUM SERPL-SCNC: 4.8 MMOL/L (ref 3.6–5.5)
PROT SERPL-MCNC: 6.6 G/DL (ref 6–8.2)
RBC # BLD AUTO: 4.68 M/UL (ref 4.2–5.4)
SODIUM SERPL-SCNC: 142 MMOL/L (ref 135–145)
TRIGL SERPL-MCNC: 103 MG/DL (ref 0–149)
WBC # BLD AUTO: 4.5 K/UL (ref 4.8–10.8)

## 2018-05-04 PROCEDURE — 80053 COMPREHEN METABOLIC PANEL: CPT

## 2018-05-04 PROCEDURE — 85025 COMPLETE CBC W/AUTO DIFF WBC: CPT

## 2018-05-04 PROCEDURE — 36415 COLL VENOUS BLD VENIPUNCTURE: CPT

## 2018-05-04 PROCEDURE — 80061 LIPID PANEL: CPT

## 2018-05-31 ENCOUNTER — HOSPITAL ENCOUNTER (OUTPATIENT)
Dept: LAB | Facility: MEDICAL CENTER | Age: 58
End: 2018-05-31
Attending: INTERNAL MEDICINE
Payer: COMMERCIAL

## 2018-05-31 ENCOUNTER — OFFICE VISIT (OUTPATIENT)
Dept: MEDICAL GROUP | Age: 58
End: 2018-05-31
Payer: COMMERCIAL

## 2018-05-31 VITALS
HEART RATE: 64 BPM | HEIGHT: 65 IN | BODY MASS INDEX: 25.99 KG/M2 | DIASTOLIC BLOOD PRESSURE: 86 MMHG | SYSTOLIC BLOOD PRESSURE: 122 MMHG | TEMPERATURE: 98.1 F | WEIGHT: 156 LBS | OXYGEN SATURATION: 93 %

## 2018-05-31 DIAGNOSIS — R53.82 CHRONIC FATIGUE: ICD-10-CM

## 2018-05-31 DIAGNOSIS — I10 ESSENTIAL HYPERTENSION: ICD-10-CM

## 2018-05-31 DIAGNOSIS — E78.2 MIXED HYPERLIPIDEMIA: ICD-10-CM

## 2018-05-31 DIAGNOSIS — K90.0 CELIAC DISEASE: ICD-10-CM

## 2018-05-31 DIAGNOSIS — Z00.00 ROUTINE GENERAL MEDICAL EXAMINATION AT A HEALTH CARE FACILITY: ICD-10-CM

## 2018-05-31 DIAGNOSIS — K21.00 GASTROESOPHAGEAL REFLUX DISEASE WITH ESOPHAGITIS: ICD-10-CM

## 2018-05-31 PROBLEM — G44.52 NEW DAILY PERSISTENT HEADACHE: Status: RESOLVED | Noted: 2017-10-26 | Resolved: 2018-05-31

## 2018-05-31 PROBLEM — R42 DIZZINESS: Status: RESOLVED | Noted: 2017-10-24 | Resolved: 2018-05-31

## 2018-05-31 LAB — TSH SERPL DL<=0.005 MIU/L-ACNC: 0.89 UIU/ML (ref 0.38–5.33)

## 2018-05-31 PROCEDURE — 36415 COLL VENOUS BLD VENIPUNCTURE: CPT

## 2018-05-31 PROCEDURE — 84443 ASSAY THYROID STIM HORMONE: CPT

## 2018-05-31 PROCEDURE — 99396 PREV VISIT EST AGE 40-64: CPT | Performed by: INTERNAL MEDICINE

## 2018-05-31 ASSESSMENT — PATIENT HEALTH QUESTIONNAIRE - PHQ9: CLINICAL INTERPRETATION OF PHQ2 SCORE: 0

## 2018-05-31 ASSESSMENT — ENCOUNTER SYMPTOMS
NEUROLOGICAL NEGATIVE: 1
MUSCULOSKELETAL NEGATIVE: 1
RESPIRATORY NEGATIVE: 1
EYES NEGATIVE: 1
PSYCHIATRIC NEGATIVE: 1
GASTROINTESTINAL NEGATIVE: 1
CONSTITUTIONAL NEGATIVE: 1
CARDIOVASCULAR NEGATIVE: 1

## 2018-05-31 NOTE — PROGRESS NOTES
"Subjective:      Isabella Strauss is a 57 y.o. female who presents with Annual Exam (excessive tiredness)  The patient is here for followup of chronic medical problems listed below. The patient is compliant with medications and having no side effects from them. Denies chest pain, abdominal pain, dyspnea, myalgias, or cough.   Patient Active Problem List    Diagnosis Date Noted   • Chronic fatigue 05/31/2018   • Essential hypertension 11/02/2017   • Gastroesophageal reflux disease with esophagitis 10/26/2017   • Vaginal atrophy 06/15/2017   • Family history of colon cancer- in 2 great paternal uncles 05/23/2016   • Mixed hyperlipidemia- mild no meds; hdl> 60 05/23/2016   • Celiac disease 10/08/2013     Allergies   Allergen Reactions   • Ivp Dye [Iodine] Anaphylaxis               Annual Exam         Review of Systems   Constitutional: Negative.    HENT: Negative.    Eyes: Negative.    Respiratory: Negative.    Cardiovascular: Negative.    Gastrointestinal: Negative.    Genitourinary: Negative.    Musculoskeletal: Negative.    Skin: Negative.    Neurological: Negative.    Endo/Heme/Allergies: Negative.    Psychiatric/Behavioral: Negative.           Objective:     /86   Pulse 64   Temp 36.7 °C (98.1 °F)   Ht 1.651 m (5' 5\")   Wt 70.8 kg (156 lb)   SpO2 93%   BMI 25.96 kg/m²      Physical Exam   Constitutional: She is oriented to person, place, and time. She appears well-developed and well-nourished. No distress.   HENT:   Head: Normocephalic and atraumatic.   Right Ear: External ear normal.   Left Ear: External ear normal.   Nose: Nose normal.   Mouth/Throat: Oropharynx is clear and moist. No oropharyngeal exudate.   Eyes: Conjunctivae and EOM are normal. Pupils are equal, round, and reactive to light. Right eye exhibits no discharge. Left eye exhibits no discharge. No scleral icterus.   Neck: Normal range of motion. Neck supple. No JVD present. No tracheal deviation present. No thyromegaly present. "   Cardiovascular: Normal rate, regular rhythm, normal heart sounds and intact distal pulses.  Exam reveals no gallop and no friction rub.    No murmur heard.  Pulmonary/Chest: Effort normal and breath sounds normal. No stridor. No respiratory distress. She has no wheezes. She has no rales. She exhibits no tenderness.   Abdominal: Soft. Bowel sounds are normal. She exhibits no distension and no mass. There is no tenderness. There is no rebound and no guarding.   Musculoskeletal: Normal range of motion. She exhibits no edema or tenderness.   Lymphadenopathy:     She has no cervical adenopathy.   Neurological: She is alert and oriented to person, place, and time. She has normal reflexes. She displays normal reflexes. No cranial nerve deficit. She exhibits normal muscle tone. Coordination normal.   Skin: Skin is warm and dry. No rash noted. She is not diaphoretic. No erythema. No pallor.   Psychiatric: She has a normal mood and affect. Her behavior is normal. Judgment and thought content normal.   Vitals reviewed.    Hospital Outpatient Visit on 05/04/2018   Component Date Value   • Sodium 05/04/2018 142    • Potassium 05/04/2018 4.8    • Chloride 05/04/2018 108    • Co2 05/04/2018 28    • Anion Gap 05/04/2018 6.0    • Glucose 05/04/2018 89    • Bun 05/04/2018 15    • Creatinine 05/04/2018 0.80    • Calcium 05/04/2018 9.8    • AST(SGOT) 05/04/2018 21    • ALT(SGPT) 05/04/2018 15    • Alkaline Phosphatase 05/04/2018 91    • Total Bilirubin 05/04/2018 0.9    • Albumin 05/04/2018 4.1    • Total Protein 05/04/2018 6.6    • Globulin 05/04/2018 2.5    • A-G Ratio 05/04/2018 1.6    • Cholesterol,Tot 05/04/2018 154    • Triglycerides 05/04/2018 103    • HDL 05/04/2018 70    • LDL 05/04/2018 63    • WBC 05/04/2018 4.5*   • RBC 05/04/2018 4.68    • Hemoglobin 05/04/2018 14.1    • Hematocrit 05/04/2018 43.7    • MCV 05/04/2018 93.4    • MCH 05/04/2018 30.1    • MCHC 05/04/2018 32.3*   • RDW 05/04/2018 44.0    • Platelet Count  05/04/2018 302    • MPV 05/04/2018 9.6    • Neutrophils-Polys 05/04/2018 50.30    • Lymphocytes 05/04/2018 33.00    • Monocytes 05/04/2018 8.70    • Eosinophils 05/04/2018 6.70    • Basophils 05/04/2018 1.30    • Immature Granulocytes 05/04/2018 0.00    • Nucleated RBC 05/04/2018 0.00    • Neutrophils (Absolute) 05/04/2018 2.25    • Lymphs (Absolute) 05/04/2018 1.48    • Monos (Absolute) 05/04/2018 0.39    • Eos (Absolute) 05/04/2018 0.30    • Baso (Absolute) 05/04/2018 0.06    • Immature Granulocytes (a* 05/04/2018 0.00    • NRBC (Absolute) 05/04/2018 0.00    • GFR If  05/04/2018 >60    • GFR If Non  Ameri* 05/04/2018 >60       No results found for: HBA1C  Lab Results   Component Value Date/Time    SODIUM 142 05/04/2018 07:03 AM    POTASSIUM 4.8 05/04/2018 07:03 AM    CHLORIDE 108 05/04/2018 07:03 AM    CO2 28 05/04/2018 07:03 AM    GLUCOSE 89 05/04/2018 07:03 AM    BUN 15 05/04/2018 07:03 AM    CREATININE 0.80 05/04/2018 07:03 AM    BUNCREATRAT 16 05/13/2017 07:25 AM    ALKPHOSPHAT 91 05/04/2018 07:03 AM    ASTSGOT 21 05/04/2018 07:03 AM    ALTSGPT 15 05/04/2018 07:03 AM    TBILIRUBIN 0.9 05/04/2018 07:03 AM     No results found for: INR  Lab Results   Component Value Date/Time    CHOLSTRLTOT 154 05/04/2018 07:03 AM    LDL 63 05/04/2018 07:03 AM    HDL 70 05/04/2018 07:03 AM    TRIGLYCERIDE 103 05/04/2018 07:03 AM       No results found for: TESTOSTERONE  No results found for: TSH  No results found for: FREET4  No results found for: URICACID  No components found for: VITB12  Lab Results   Component Value Date/Time    25HYDROXY 40 10/09/2013 08:10 AM                 Assessment/Plan:     1. Routine general medical examination at a health care facility   normal exam    2. Chronic fatigue    R/o low t4  - TSH; Future    3. Essential hypertension   Under good control. Continue same regimen.      4. Gastroesophageal reflux disease with esophagitis   Under good control. Continue same  regimen.      5. Mixed hyperlipidemia- mild no meds; hdl> 60  Under good control. Continue same regimen.           6. Celiac disease  Under good control. Continue same regimen.'     .33  30 minute face-to-face encounter took place today.  More than half of this time was spent in the coordination of care of the above problems, as well as counseling.

## 2019-02-05 ENCOUNTER — OFFICE VISIT (OUTPATIENT)
Dept: URGENT CARE | Facility: CLINIC | Age: 59
End: 2019-02-05
Payer: COMMERCIAL

## 2019-02-05 VITALS
DIASTOLIC BLOOD PRESSURE: 92 MMHG | SYSTOLIC BLOOD PRESSURE: 142 MMHG | RESPIRATION RATE: 16 BRPM | OXYGEN SATURATION: 98 % | TEMPERATURE: 97.8 F | WEIGHT: 160 LBS | HEART RATE: 70 BPM | HEIGHT: 65 IN | BODY MASS INDEX: 26.66 KG/M2

## 2019-02-05 DIAGNOSIS — S61.111A LACERATION OF RIGHT THUMB WITHOUT FOREIGN BODY WITH DAMAGE TO NAIL, INITIAL ENCOUNTER: ICD-10-CM

## 2019-02-05 PROCEDURE — 12041 INTMD RPR N-HF/GENIT 2.5CM/<: CPT | Performed by: NURSE PRACTITIONER

## 2019-02-05 RX ORDER — SULFAMETHOXAZOLE AND TRIMETHOPRIM 800; 160 MG/1; MG/1
1 TABLET ORAL 2 TIMES DAILY
Qty: 14 TAB | Refills: 0 | Status: SHIPPED | OUTPATIENT
Start: 2019-02-05 | End: 2019-02-12

## 2019-02-05 ASSESSMENT — ENCOUNTER SYMPTOMS
SHORTNESS OF BREATH: 0
FEVER: 0
PALPITATIONS: 0
WHEEZING: 0
ROS SKIN COMMENTS: RIGHT THUMB LACERATION
CHILLS: 0

## 2019-02-05 NOTE — PROGRESS NOTES
"Subjective:   Isabella Strauss is a 58 y.o. female who presents for Hand Pain (Right thumb pain an hour earlier)        Laceration    The incident occurred 1 to 3 hours ago. The laceration is located on the right hand (Right thumb). Size: Nailbed missing, jagged edges 2cm partial thickness lac. Injury mechanism: Barn gate. The pain is moderate. The pain has been constant since onset. She reports no foreign bodies present. Her tetanus status is UTD.        Review of Systems   Constitutional: Negative for chills and fever.   Respiratory: Negative for shortness of breath and wheezing.    Cardiovascular: Negative for chest pain and palpitations.   Skin:        Right thumb laceration     PMH:  has a past medical history of Celiac disease.  MEDS:   Current Outpatient Prescriptions:   •  sulfamethoxazole-trimethoprim (BACTRIM DS) 800-160 MG tablet, Take 1 Tab by mouth 2 times a day for 7 days., Disp: 14 Tab, Rfl: 0  •  therapeutic multivitamin-minerals (THERAGRAN-M) Tab, Take 1 Tab by mouth every day., Disp: , Rfl:   •  aspirin EC (ECOTRIN) 81 MG Tablet Delayed Response, Take 81 mg by mouth every day., Disp: , Rfl:   ALLERGIES:   Allergies   Allergen Reactions   • Ivp Dye [Iodine] Anaphylaxis     SURGHX:   Past Surgical History:   Procedure Laterality Date   • PB REDUCTION OF LARGE BREAST  March 2014   • APPENDECTOMY     • DENTAL EXTRACTION(S)     • OVARIAN CYSTECTOMY     • TONSILLECTOMY AND ADENOIDECTOMY       SOCHX:  reports that she has quit smoking. She has a 20.00 pack-year smoking history. She has never used smokeless tobacco. She reports that she drinks about 0.5 oz of alcohol per week . She reports that she does not use drugs.  FH: Family history was reviewed, no pertinent findings to report     Objective:   /92 (BP Location: Left arm, Patient Position: Standing, BP Cuff Size: Adult)   Pulse 70   Temp 36.6 °C (97.8 °F) (Temporal)   Resp 16   Ht 1.651 m (5' 5\")   Wt 72.6 kg (160 lb)   SpO2 98%   BMI 26.63 " kg/m²   Physical Exam   Constitutional: She appears well-developed and well-nourished. She appears distressed.   Cardiovascular: Normal rate and regular rhythm.    Pulmonary/Chest: Effort normal and breath sounds normal.   Skin: Laceration noted. She is not diaphoretic.   Laceration to the right rhumb with complete nailbed avulsion. 2 cm length partial thickness with jagged edges.   Vitals reviewed.        Assessment/Plan:   Assessment    1. Laceration of right thumb without foreign body with damage to nail, initial encounter  - sulfamethoxazole-trimethoprim (BACTRIM DS) 800-160 MG tablet; Take 1 Tab by mouth 2 times a day for 7 days.  Dispense: 14 Tab; Refill: 0    Procedure: Laceration Repair  -Risks including bleeding, nerve damage, infection, and poor cosmetic outcome discussed at length. Benefits and alternatives discussed.   -Sterile technique throughout  -Local anesthesia with 2% lidocaine  -Closed with 3   # 6 -0 Nylon interrupted sutures with good wound approximation  -Xeroform gauze and dressing placed  -Patient tolerated well    Discussed home wound care instructions  Given the nature of the injury, I would like her to follow up in 2-3 days for wound recheck    Differential diagnosis, natural history, supportive care, and indications for immediate follow-up discussed.

## 2019-02-08 ENCOUNTER — OFFICE VISIT (OUTPATIENT)
Dept: URGENT CARE | Facility: CLINIC | Age: 59
End: 2019-02-08
Payer: COMMERCIAL

## 2019-02-08 VITALS
HEART RATE: 76 BPM | DIASTOLIC BLOOD PRESSURE: 68 MMHG | RESPIRATION RATE: 14 BRPM | BODY MASS INDEX: 26.66 KG/M2 | HEIGHT: 65 IN | TEMPERATURE: 97.8 F | WEIGHT: 160 LBS | OXYGEN SATURATION: 96 % | SYSTOLIC BLOOD PRESSURE: 122 MMHG

## 2019-02-08 DIAGNOSIS — S61.111D LACERATION OF RIGHT THUMB WITHOUT FOREIGN BODY WITH DAMAGE TO NAIL, SUBSEQUENT ENCOUNTER: ICD-10-CM

## 2019-02-08 PROCEDURE — 99213 OFFICE O/P EST LOW 20 MIN: CPT | Performed by: PHYSICIAN ASSISTANT

## 2019-02-08 ASSESSMENT — ENCOUNTER SYMPTOMS
CHILLS: 0
NAUSEA: 0
VOMITING: 0
DIARRHEA: 0
FEVER: 0

## 2019-02-08 NOTE — PROGRESS NOTES
Subjective:   Isabella Strauss is a 58 y.o. female who presents for Wound Check       Patient presents today for wound check. She states that the laceration site feels much better. She states that she has been keeping it clean and protected with bandaging. She states that she has been having some clear/bloody discharge that stopped this morning. She states she has been tolerating antibiotic well.       Wound Check   She was originally treated 2 to 3 days ago. Previous treatment included oral antibiotics, wound cleansing or irrigation and laceration repair. Maximum temperature: no fever. There has been no drainage from the wound. The redness has improved. There is no swelling present. The pain has improved. There is difficulty moving the extremity or digit due to pain.     Review of Systems   Constitutional: Negative for chills and fever.   Gastrointestinal: Negative for diarrhea, nausea and vomiting.   Skin:        Positive for healing laceration on right thumb   All other systems reviewed and are negative.      PMH:  has a past medical history of Celiac disease.    MEDS:   Current Outpatient Prescriptions:   •  sulfamethoxazole-trimethoprim (BACTRIM DS) 800-160 MG tablet, Take 1 Tab by mouth 2 times a day for 7 days., Disp: 14 Tab, Rfl: 0  •  therapeutic multivitamin-minerals (THERAGRAN-M) Tab, Take 1 Tab by mouth every day., Disp: , Rfl:   •  aspirin EC (ECOTRIN) 81 MG Tablet Delayed Response, Take 81 mg by mouth every day., Disp: , Rfl:     ALLERGIES:   Allergies   Allergen Reactions   • Ivp Dye [Iodine] Anaphylaxis       SURGHX:   Past Surgical History:   Procedure Laterality Date   • PB REDUCTION OF LARGE BREAST  March 2014   • APPENDECTOMY     • DENTAL EXTRACTION(S)     • OVARIAN CYSTECTOMY     • TONSILLECTOMY AND ADENOIDECTOMY         SOCHX:  reports that she has quit smoking. She has a 20.00 pack-year smoking history. She has never used smokeless tobacco. She reports that she drinks about 0.5 oz of alcohol per  "week . She reports that she does not use drugs.    FH: Reviewed with patient, not pertinent to this visit.     Objective:   /68 (BP Location: Right arm, Patient Position: Sitting)   Pulse 76   Temp 36.6 °C (97.8 °F)   Resp 14   Ht 1.651 m (5' 5\")   Wt 72.6 kg (160 lb)   SpO2 96%   BMI 26.63 kg/m²   Physical Exam   Constitutional: She is oriented to person, place, and time. She appears well-developed and well-nourished. No distress.   HENT:   Head: Normocephalic and atraumatic.   Eyes: Conjunctivae and EOM are normal.   Neck: Normal range of motion. No tracheal deviation present.   Cardiovascular: Normal rate and regular rhythm.    Pulses:       Radial pulses are 2+ on the right side, and 2+ on the left side.   Pulmonary/Chest: Effort normal. No respiratory distress.   Musculoskeletal:   ROM normal all four extremities   Neurological: She is alert and oriented to person, place, and time.   Skin: Skin is warm and dry. Capillary refill takes less than 2 seconds.   Healing laceration on right thumb along proximal nail margin. Nail avulsed. No drainage, warmth, erythema around laceration.    Psychiatric: She has a normal mood and affect. Her behavior is normal. Judgment and thought content normal.       Assessment/Plan:   1. Laceration of right thumb without foreign body with damage to nail, subsequent encounter    - Advised to continue antibiotic treatment and to complete course  - Advised to keep site clean, dry, and protected during work  - Advised to return in 4-7 days for wound check and suture removal  - Advised to return sooner if s/s infection develop        Differential diagnosis, natural history, supportive care, and indications for immediate follow-up discussed.  "

## 2019-05-24 ENCOUNTER — HOSPITAL ENCOUNTER (OUTPATIENT)
Dept: LAB | Facility: MEDICAL CENTER | Age: 59
End: 2019-05-24
Attending: INTERNAL MEDICINE
Payer: COMMERCIAL

## 2019-05-24 DIAGNOSIS — R53.82 CHRONIC FATIGUE: ICD-10-CM

## 2019-05-24 DIAGNOSIS — E78.2 MIXED HYPERLIPIDEMIA: ICD-10-CM

## 2019-05-24 LAB
ALBUMIN SERPL BCP-MCNC: 4.2 G/DL (ref 3.2–4.9)
ALBUMIN/GLOB SERPL: 1.7 G/DL
ALP SERPL-CCNC: 99 U/L (ref 30–99)
ALT SERPL-CCNC: 23 U/L (ref 2–50)
ANION GAP SERPL CALC-SCNC: 5 MMOL/L (ref 0–11.9)
AST SERPL-CCNC: 22 U/L (ref 12–45)
BASOPHILS # BLD AUTO: 1.2 % (ref 0–1.8)
BASOPHILS # BLD: 0.06 K/UL (ref 0–0.12)
BILIRUB SERPL-MCNC: 0.4 MG/DL (ref 0.1–1.5)
BUN SERPL-MCNC: 20 MG/DL (ref 8–22)
CALCIUM SERPL-MCNC: 9.5 MG/DL (ref 8.5–10.5)
CHLORIDE SERPL-SCNC: 107 MMOL/L (ref 96–112)
CHOLEST SERPL-MCNC: 184 MG/DL (ref 100–199)
CO2 SERPL-SCNC: 29 MMOL/L (ref 20–33)
CREAT SERPL-MCNC: 0.68 MG/DL (ref 0.5–1.4)
EOSINOPHIL # BLD AUTO: 0.55 K/UL (ref 0–0.51)
EOSINOPHIL NFR BLD: 11 % (ref 0–6.9)
ERYTHROCYTE [DISTWIDTH] IN BLOOD BY AUTOMATED COUNT: 45.3 FL (ref 35.9–50)
FASTING STATUS PATIENT QL REPORTED: NORMAL
GLOBULIN SER CALC-MCNC: 2.5 G/DL (ref 1.9–3.5)
GLUCOSE SERPL-MCNC: 86 MG/DL (ref 65–99)
HCT VFR BLD AUTO: 44.3 % (ref 37–47)
HDLC SERPL-MCNC: 77 MG/DL
HGB BLD-MCNC: 14.4 G/DL (ref 12–16)
IMM GRANULOCYTES # BLD AUTO: 0.01 K/UL (ref 0–0.11)
IMM GRANULOCYTES NFR BLD AUTO: 0.2 % (ref 0–0.9)
LDLC SERPL CALC-MCNC: 87 MG/DL
LYMPHOCYTES # BLD AUTO: 1.66 K/UL (ref 1–4.8)
LYMPHOCYTES NFR BLD: 33.2 % (ref 22–41)
MCH RBC QN AUTO: 30.7 PG (ref 27–33)
MCHC RBC AUTO-ENTMCNC: 32.5 G/DL (ref 33.6–35)
MCV RBC AUTO: 94.5 FL (ref 81.4–97.8)
MONOCYTES # BLD AUTO: 0.37 K/UL (ref 0–0.85)
MONOCYTES NFR BLD AUTO: 7.4 % (ref 0–13.4)
NEUTROPHILS # BLD AUTO: 2.35 K/UL (ref 2–7.15)
NEUTROPHILS NFR BLD: 47 % (ref 44–72)
NRBC # BLD AUTO: 0 K/UL
NRBC BLD-RTO: 0 /100 WBC
PLATELET # BLD AUTO: 319 K/UL (ref 164–446)
PMV BLD AUTO: 9.3 FL (ref 9–12.9)
POTASSIUM SERPL-SCNC: 4.2 MMOL/L (ref 3.6–5.5)
PROT SERPL-MCNC: 6.7 G/DL (ref 6–8.2)
RBC # BLD AUTO: 4.69 M/UL (ref 4.2–5.4)
SODIUM SERPL-SCNC: 141 MMOL/L (ref 135–145)
TRIGL SERPL-MCNC: 102 MG/DL (ref 0–149)
TSH SERPL DL<=0.005 MIU/L-ACNC: 1.34 UIU/ML (ref 0.38–5.33)
WBC # BLD AUTO: 5 K/UL (ref 4.8–10.8)

## 2019-05-24 PROCEDURE — 36415 COLL VENOUS BLD VENIPUNCTURE: CPT

## 2019-05-24 PROCEDURE — 80053 COMPREHEN METABOLIC PANEL: CPT

## 2019-05-24 PROCEDURE — 85025 COMPLETE CBC W/AUTO DIFF WBC: CPT

## 2019-05-24 PROCEDURE — 84443 ASSAY THYROID STIM HORMONE: CPT

## 2019-05-24 PROCEDURE — 80061 LIPID PANEL: CPT

## 2019-05-31 ENCOUNTER — HOSPITAL ENCOUNTER (OUTPATIENT)
Dept: LAB | Facility: MEDICAL CENTER | Age: 59
End: 2019-05-31
Attending: INTERNAL MEDICINE
Payer: COMMERCIAL

## 2019-05-31 ENCOUNTER — OFFICE VISIT (OUTPATIENT)
Dept: MEDICAL GROUP | Age: 59
End: 2019-05-31
Payer: COMMERCIAL

## 2019-05-31 VITALS
OXYGEN SATURATION: 95 % | DIASTOLIC BLOOD PRESSURE: 62 MMHG | HEIGHT: 65 IN | BODY MASS INDEX: 26.06 KG/M2 | TEMPERATURE: 98.6 F | HEART RATE: 62 BPM | SYSTOLIC BLOOD PRESSURE: 112 MMHG | WEIGHT: 156.4 LBS

## 2019-05-31 DIAGNOSIS — Z82.3 FAMILY HISTORY OF CVA: ICD-10-CM

## 2019-05-31 DIAGNOSIS — Z00.00 ANNUAL PHYSICAL EXAM: ICD-10-CM

## 2019-05-31 DIAGNOSIS — Z23 NEED FOR VACCINATION: ICD-10-CM

## 2019-05-31 LAB
HCV AB SER QL: NEGATIVE
MEV IGG SER IA-ACNC: 0.36
MUV IGG SER IA-ACNC: 3.17
RUBV AB SER QL: >500 IU/ML

## 2019-05-31 PROCEDURE — 86803 HEPATITIS C AB TEST: CPT

## 2019-05-31 PROCEDURE — 86762 RUBELLA ANTIBODY: CPT

## 2019-05-31 PROCEDURE — 86765 RUBEOLA ANTIBODY: CPT

## 2019-05-31 PROCEDURE — 86735 MUMPS ANTIBODY: CPT

## 2019-05-31 PROCEDURE — 99396 PREV VISIT EST AGE 40-64: CPT | Performed by: FAMILY MEDICINE

## 2019-05-31 PROCEDURE — 36415 COLL VENOUS BLD VENIPUNCTURE: CPT

## 2019-05-31 ASSESSMENT — PATIENT HEALTH QUESTIONNAIRE - PHQ9: CLINICAL INTERPRETATION OF PHQ2 SCORE: 0

## 2019-05-31 NOTE — PROGRESS NOTES
This medical record contains text that has been entered with the assistance of computer voice recognition and dictation software.  Therefore, it may contain unintended errors in text, spelling, punctuation, or grammar        Chief Complaint   Patient presents with   • Annual Exam     lab review         Isabella Strauss is a 58 y.o. female here evaluation and management of: ANNUAL PHYSICAL       HPI:     1. Family history of CVA  The patient states that she has been taking daily baby aspirin 81 mg p.o. daily to prevent stroke.  She states that both her mother and maternal grandmother suffered from strokes in their mid 50s.  She denies any headaches today, no change in vision, no change in smell, no numbness or tingling, no weakness in any extremity.    2. Annual physical exam  The patient is a 58-year-old female who presents to clinic for her annual wellness visit.  She has a significant past medical history of gastroesophageal reflux disease , celiac disease, and a strong family h/o CVA.The patient denied any chest pain, no sob, no treviño, no  pnd, no orthopnea, no headache, no changes in vision, no numbness or tingling, no nausea, no diarrhea, no abdominal pain, no fevers, no chills, no bright red blood per rectum, no  difficulty urinating, no burning during micturition, no depressed mood, no other concerns.      Occupation----rancher    Exercise---only riding horses    Colonoscopy---up to date        3. Need for vaccination  Due for hepatitis C screening  The patient would also like to check for immunity for measles    Current medicines (including changes today)  Current Outpatient Prescriptions   Medication Sig Dispense Refill   • therapeutic multivitamin-minerals (THERAGRAN-M) Tab Take 1 Tab by mouth every day.     • aspirin EC (ECOTRIN) 81 MG Tablet Delayed Response Take 81 mg by mouth every day.       No current facility-administered medications for this visit.      She  has a past medical history of Celiac  "disease.  She  has a past surgical history that includes pr reduction of large breast (2014); ovarian cystectomy; tonsillectomy and adenoidectomy; appendectomy; and dental extraction(s).  Social History   Substance Use Topics   • Smoking status: Former Smoker     Packs/day: 2.00     Years: 10.00   • Smokeless tobacco: Never Used      Comment: quit smoking at 30 yrs old.   • Alcohol use No     Social History     Social History Narrative   • No narrative on file     Family History   Problem Relation Age of Onset   • Stroke Mother 58        multiple   • Hypertension Mother    • Hyperlipidemia Mother    • Cancer Father    • Lung Disease Father    • Stroke Maternal Grandmother 50   • Hyperlipidemia Maternal Grandmother    • Diabetes Maternal Grandfather    • Heart Disease Neg Hx      Family Status   Relation Status   • Mo  at age 68        CVA   • Fa  at age 72        lung CA   • MGMo (Not Specified)   • MGFa (Not Specified)   • Neg Hx (Not Specified)         ROS    The pertinent  ROS findings can be seen in the HPI above.     All other systems reviewed and are negative     Objective:     /62   Pulse 62   Temp 37 °C (98.6 °F)   Ht 1.645 m (5' 4.75\")   Wt 70.9 kg (156 lb 6.4 oz)   SpO2 95%  Body mass index is 26.23 kg/m².  Physical Exam:    Constitutional: Alert, no distress.  Skin: tan skin, no rashes  Eye: Equal, round and reactive, conjunctiva clear, lids normal.  ENMT: Lips without lesions, good dentition, oropharynx clear.  Neck: Trachea midline, no masses, no thyromegaly. No cervical or supraclavicular lymphadenopathy.  Respiratory: Unlabored respiratory effort, lungs clear to auscultation, no wheezes, no ronchi.  Cardiovascular: Normal S1, S2, no murmur, no edema  Abdomen: Soft, non-tender, no masses, no hepatosplenomegaly.  Psych: Alert and oriented x3, normal affect and mood.          Assessment and Plan:   The following treatment plan was discussed    1. Annual physical " exam  Care has been established    We await the benefit of daily prophylactic aspirin   In terms of benefits vs risk of GI bleed    We reviewed USPSTF guidelines  The USPSTF recommends initiating low-dose aspirin use for the primary prevention of cardiovascular disease (CVD) and colorectal cancer (CRC) in adults aged 50 to 59 years who have a 10% or greater 10-year CVD risk, are not at increased risk for bleeding, have a life expectancy of at least 10 years, and are willing to take low-dose aspirin daily for at least 10 years.      This patient is up to date on  mammogram  Up to date on colonoscopy and PAP   Denies intimate partner viloence    2. Family history of CVA  We discussed the benefits and risks of daily baby aspirin.  Given her strong family history of CVA in early we can safely continue.      This patient is up to date on  mammogram  Up to date on colonoscopy and PAP   Denies intimate partner viloence        3. Need for vaccination      - MEASLES/MUMPS/RUBELLA IMMUNITY; Future  - HEP C VIRUS ANTIBODY; Future      Instructed to Follow up in clinic or ER for worsening symptoms, difficulty breathing, lack of expected recovery, or should new symptoms or problems arise.    Followup: Return in about 6 months (around 11/30/2019) for Reevaluation.       Once again this medical record contains text that has been entered with the assistance of computer voice recognition and dictation software.  Therefore, it may contain unintended errors in text, spelling, punctuation, or grammar

## 2019-11-22 ENCOUNTER — OFFICE VISIT (OUTPATIENT)
Dept: MEDICAL GROUP | Age: 59
End: 2019-11-22
Payer: COMMERCIAL

## 2019-11-22 VITALS
BODY MASS INDEX: 25.59 KG/M2 | TEMPERATURE: 98 F | HEART RATE: 62 BPM | DIASTOLIC BLOOD PRESSURE: 76 MMHG | OXYGEN SATURATION: 97 % | HEIGHT: 65 IN | SYSTOLIC BLOOD PRESSURE: 114 MMHG | WEIGHT: 153.6 LBS

## 2019-11-22 DIAGNOSIS — E53.8 VITAMIN B 12 DEFICIENCY: ICD-10-CM

## 2019-11-22 DIAGNOSIS — E55.9 VITAMIN D DEFICIENCY: ICD-10-CM

## 2019-11-22 DIAGNOSIS — E61.1 IRON DEFICIENCY: ICD-10-CM

## 2019-11-22 DIAGNOSIS — E78.2 MIXED HYPERLIPIDEMIA: ICD-10-CM

## 2019-11-22 DIAGNOSIS — Z00.00 HEALTH CARE MAINTENANCE: ICD-10-CM

## 2019-11-22 DIAGNOSIS — K21.00 GASTROESOPHAGEAL REFLUX DISEASE WITH ESOPHAGITIS: ICD-10-CM

## 2019-11-22 DIAGNOSIS — Z12.31 OTHER SCREENING MAMMOGRAM: ICD-10-CM

## 2019-11-22 DIAGNOSIS — I10 ESSENTIAL HYPERTENSION: ICD-10-CM

## 2019-11-22 DIAGNOSIS — K52.9 CHRONIC DIARRHEA: ICD-10-CM

## 2019-11-22 DIAGNOSIS — K90.0 CELIAC DISEASE: ICD-10-CM

## 2019-11-22 DIAGNOSIS — K42.9 UMBILICAL HERNIA WITHOUT OBSTRUCTION AND WITHOUT GANGRENE: ICD-10-CM

## 2019-11-22 DIAGNOSIS — J30.9 CHRONIC ALLERGIC RHINITIS: ICD-10-CM

## 2019-11-22 PROCEDURE — 99214 OFFICE O/P EST MOD 30 MIN: CPT | Performed by: INTERNAL MEDICINE

## 2019-11-22 RX ORDER — AZELASTINE 1 MG/ML
1 SPRAY, METERED NASAL 2 TIMES DAILY
Qty: 30 ML | Refills: 11 | Status: SHIPPED | OUTPATIENT
Start: 2019-11-22 | End: 2021-05-26

## 2019-11-22 ASSESSMENT — ENCOUNTER SYMPTOMS
NAUSEA: 0
NEUROLOGICAL NEGATIVE: 1
DIARRHEA: 1
MUSCULOSKELETAL NEGATIVE: 1
VOMITING: 0
CARDIOVASCULAR NEGATIVE: 1
PSYCHIATRIC NEGATIVE: 1
RESPIRATORY NEGATIVE: 1
EYES NEGATIVE: 1

## 2019-11-22 ASSESSMENT — PAIN SCALES - GENERAL: PAINLEVEL: NO PAIN

## 2019-11-22 NOTE — PROGRESS NOTES
Subjective:      Isabella Strauss is a 59 y.o. female who presents with Diarrhea (soft liquidy stools x 4 months) and Sinusitis (experiencing headsches at night)        HPI    The patient is here for followup of chronic medical problems listed below. The patient is compliant with medications and having no side effects from them. Denies chest pain, abdominal pain, dyspnea, myalgias, or cough.     She denies family history of breast cancer. She would however like to receive a mammogram for cancer screening. She would additionally like to receive a pap smear for cancer screening.     1. Celiac disease  2. Chronic diarrhea    The patient has a chronic history of celiac disease. She presents today for evaluation of persistent diarrhea onset 2 months. She describes the diarrhea as soft and liquidy. She admits to additional symptoms of headaches at night, and dehydration but denies bloody stool, fever, chills, nausea or vomiting. She denies any alleviating factors. She denies recent travel outside of the country. She notes she will receive her colonoscopy next year.     3. Chronic allergic rhinitis    She has a history of allergic rhinitis with rhinorrhea, sinus congestion and frequent sneezing. She denies taking medication for her seasonal allergies. She would like to try a medication for better management.She denies any dry mouth, dizziness, drowsiness, nausea or vomiting.      Patient Active Problem List   Diagnosis   • Celiac disease   • Family history of colon cancer- in 2 great paternal uncles   • Mixed hyperlipidemia- mild no meds; hdl> 60   • Vaginal atrophy   • Gastroesophageal reflux disease with esophagitis   • Chronic fatigue   • Family history of CVA   • Annual physical exam   • Chronic diarrhea   • Chronic allergic rhinitis   • Umbilical hernia without obstruction and without gangrene       Outpatient Medications Prior to Visit   Medication Sig Dispense Refill   • therapeutic multivitamin-minerals (THERAGRAN-M)  "Tab Take 1 Tab by mouth every day.     • aspirin EC (ECOTRIN) 81 MG Tablet Delayed Response Take 81 mg by mouth every day.       No facility-administered medications prior to visit.         Allergies   Allergen Reactions   • Ivp Dye [Iodine] Anaphylaxis       Review of Systems   Constitutional:        Positive for dehydration   HENT: Positive for congestion.         Positive for rhinorrhea and sneezing.    Eyes: Negative.    Respiratory: Negative.    Cardiovascular: Negative.    Gastrointestinal: Positive for diarrhea. Negative for nausea and vomiting.   Genitourinary: Negative.    Musculoskeletal: Negative.    Skin: Negative.    Neurological: Negative.    Endo/Heme/Allergies: Negative.    Psychiatric/Behavioral: Negative.    All other systems reviewed and are negative.       Objective:     /76 (BP Location: Left arm, Patient Position: Sitting, BP Cuff Size: Adult)   Pulse 62   Temp 36.7 °C (98 °F)   Ht 1.645 m (5' 4.75\")   Wt 69.7 kg (153 lb 9.6 oz)   SpO2 97%   BMI 25.76 kg/m²     Physical Exam   Constitutional: Oriented to person, place, and time. Appears well-developed and well-nourished. No distress.   Head: Normocephalic and atraumatic.   Right Ear: External ear normal.   Left Ear: External ear normal.   Nose: Nose normal.   Mouth/Throat: Oropharynx is clear and moist. No oropharyngeal exudate.   Eyes: Pupils are equal, round, and reactive to light. Conjunctivae and EOM are normal. Right eye exhibits no discharge. Left eye exhibits no discharge. No scleral icterus.   Neck: Normal range of motion. Neck supple. No JVD present. No tracheal deviation present. No thyromegaly present.   Cardiovascular: Normal rate, regular rhythm, normal heart sounds and intact distal pulses.  Exam reveals no gallop and no friction rub.    No murmur heard.  Pulmonary/Chest: Effort normal. No stridor. No respiratory distress. No wheezing or rales. No tenderness.   Abdominal: 1.5 cm hernia in umbilicus, reducible. Soft. " Bowel sounds are normal. No distension and no mass. There is no tenderness. There is no rebound and no guarding.   Musculoskeletal: Normal range of motion No edema or tenderness.   Lymphadenopathy: No cervical adenopathy.   Neurological: Alert and oriented to person, place, and time. Normal reflexes. Normal reflexes. No cranial nerve deficit. Normal muscle tone. Coordination normal.   Skin: Skin is warm and dry. No rash noted. Not diaphoretic. No erythema. No pallor.   Psychiatric: Normal mood and affect. Behavior is normal. Judgment and thought content normal.   Nursing note and vitals reviewed.    No results found for: HBA1C  Lab Results   Component Value Date/Time    SODIUM 141 05/24/2019 06:05 AM    POTASSIUM 4.2 05/24/2019 06:05 AM    CHLORIDE 107 05/24/2019 06:05 AM    CO2 29 05/24/2019 06:05 AM    GLUCOSE 86 05/24/2019 06:05 AM    BUN 20 05/24/2019 06:05 AM    CREATININE 0.68 05/24/2019 06:05 AM    BUNCREATRAT 16 05/13/2017 07:25 AM    ALKPHOSPHAT 99 05/24/2019 06:05 AM    ASTSGOT 22 05/24/2019 06:05 AM    ALTSGPT 23 05/24/2019 06:05 AM    TBILIRUBIN 0.4 05/24/2019 06:05 AM     No results found for: INR  Lab Results   Component Value Date/Time    CHOLSTRLTOT 184 05/24/2019 06:05 AM    LDL 87 05/24/2019 06:05 AM    HDL 77 05/24/2019 06:05 AM    TRIGLYCERIDE 102 05/24/2019 06:05 AM       No results found for: TESTOSTERONE  No results found for: TSH  No results found for: FREET4  No results found for: URICACID  No components found for: VITB12  Lab Results   Component Value Date/Time    25HYDROXY 40 10/09/2013 08:10 AM          Assessment/Plan:     1. Essential hypertension    Chronic history. Under good control. Continue same regimen. BP today is 114/76.     2. Gastroesophageal reflux disease with esophagitis    Chronic history. Under good control. Continue same regimen.     3. Mixed hyperlipidemia- mild no meds; hdl> 60    Chronic history. Under good control. Continue same regimen.     4. Celiac disease  5.  Chronic diarrhea    Chronic history. I recommended she continue to stay away from foods containing gluten. She will receive labs for follow up.    - Complete O&P; Future  - CULTURE STOOL; Future  - Cdiff By PCR Rflx Toxin; Future  - VITAMIN B12; Future  - FOLATE; Future  - FERRITIN; Future  - IRON/TOTAL IRON BIND; Future  - TSH; Future    6. Chronic allergic rhinitis    I have prescribed her Astelin 137 mcg/spray nasal spray, 1 spray in nose twice daily, for better allergy management. She will receive labs for follow up.      - azelastine (ASTELIN) 137 MCG/SPRAY nasal spray; Spray 1 Spray in nose 2 times a day.  Dispense: 30 mL; Refill: 11    7. Health care maintenance    She will receive labs for follow up, 1-2 weeks prior to next visit.      - CBC WITH DIFFERENTIAL; Future  - Lipid Profile; Future  - Comp Metabolic Panel; Future    8. Vitamin B 12 deficiency    Under good control. Continue same regimen.     9. Vitamin D deficiency    Under good control. Continue same regimen. She will receive labs for follow up, 1-2 weeks prior to next visit.      - VITAMIN D,25 HYDROXY; Future    10. Iron deficiency    Under good control. Continue same regimen. She will receive labs for follow up, 1-2 weeks prior to next visit.      - FERRITIN; Future  - IRON/TOTAL IRON BIND; Future    3. Umbilical hernia without obstruction and without gangrene- new prob; reducible 1.5 cm ; no pain; ref gen. surg    I have referred her to general surgery for further evaluation.     - REFERRAL TO GENERAL SURGERY    Gurpreet BUSTAMANTE (Richard), am scribing for, and in the presence of, Luis Frias M.D..    Electronically signed by: Gurpreet Grimes (Richard), 11/22/2019    ILuis M.D., personally performed the services described in this documentation, as scribed by Gurpreet Grimes in my presence, and it is both accurate and complete.

## 2019-12-19 ENCOUNTER — OFFICE VISIT (OUTPATIENT)
Dept: MEDICAL GROUP | Age: 59
End: 2019-12-19
Payer: COMMERCIAL

## 2019-12-19 ENCOUNTER — HOSPITAL ENCOUNTER (OUTPATIENT)
Dept: RADIOLOGY | Facility: MEDICAL CENTER | Age: 59
End: 2019-12-19
Attending: PHYSICIAN ASSISTANT
Payer: COMMERCIAL

## 2019-12-19 VITALS
BODY MASS INDEX: 25.83 KG/M2 | SYSTOLIC BLOOD PRESSURE: 116 MMHG | HEART RATE: 64 BPM | OXYGEN SATURATION: 96 % | DIASTOLIC BLOOD PRESSURE: 70 MMHG | WEIGHT: 155 LBS | HEIGHT: 65 IN | TEMPERATURE: 96.7 F

## 2019-12-19 DIAGNOSIS — S82.831A OTHER CLOSED FRACTURE OF PROXIMAL END OF RIGHT FIBULA, INITIAL ENCOUNTER: ICD-10-CM

## 2019-12-19 DIAGNOSIS — M89.8X6 PAIN OF RIGHT FIBULA: ICD-10-CM

## 2019-12-19 DIAGNOSIS — S89.91XA INJURY OF RIGHT KNEE, INITIAL ENCOUNTER: ICD-10-CM

## 2019-12-19 PROCEDURE — 73552 X-RAY EXAM OF FEMUR 2/>: CPT | Mod: RT

## 2019-12-19 PROCEDURE — 73590 X-RAY EXAM OF LOWER LEG: CPT | Mod: RT

## 2019-12-19 PROCEDURE — 73562 X-RAY EXAM OF KNEE 3: CPT | Mod: RT

## 2019-12-19 PROCEDURE — 99213 OFFICE O/P EST LOW 20 MIN: CPT | Performed by: PHYSICIAN ASSISTANT

## 2019-12-19 NOTE — PROGRESS NOTES
"cc: Right leg pain    Subjective:     HPI  Right leg pain  Isabella presents with complaints of right knee and thigh pain.  She was kicked by a horse about 3 weeks ago.  She states the horse kicked her twice.  Once right above the right knee, and then again just below the right knee on the lateral side of the leg.  She states that she had significant swelling and bruising, this has significantly subsided.  She does still have moderate bruising on the anterior portion of the thigh.  She is mostly tender along the lateral portion of the leg, just inferior to the knee.  She is able to bear weight without any pain, however at the end of the day her leg is \"tired\".  She at times will have a  sharp stabbing sensation along the distal portion of the quadricep.  She still fairly tender along the fibula to touch.  She has been icing and elevating.  Denies weakness, numbness/tingling, open lesions, joint redness or warmth.        Review of systems:  See above.       Current Outpatient Medications:   •  azelastine (ASTELIN) 137 MCG/SPRAY nasal spray, Spray 1 Spray in nose 2 times a day., Disp: 30 mL, Rfl: 11  •  therapeutic multivitamin-minerals (THERAGRAN-M) Tab, Take 1 Tab by mouth every day., Disp: , Rfl:   •  aspirin EC (ECOTRIN) 81 MG Tablet Delayed Response, Take 81 mg by mouth every day., Disp: , Rfl:     Allergies, past medical history, past surgical history, family history, social history reviewed and updated    Objective:     Vitals: /70 (BP Location: Left arm, Patient Position: Sitting, BP Cuff Size: Adult)   Pulse 64   Temp 35.9 °C (96.7 °F) (Temporal)   Ht 1.645 m (5' 4.75\")   Wt 70.3 kg (155 lb)   LMP  (LMP Unknown)   SpO2 96%   Breastfeeding? No   BMI 25.99 kg/m²   General: Alert, pleasant, NAD  Right Knee: negative anterior drawer, negative posterior drawer, negative lachmann,  no joint line tenderness, negative Thessalay test, normal gait.  Moderate tenderness palpation along the proximal portion of " the fibula.  With slight edema.  Moderate tenderness of the distal portion of the quadriceps muscle, with slight ecchymosis and edema.  No patellar crepitus.  No erythema, warmth.  Strength of bilateral lower extremities intact.  Patellar and Achilles reflexes intact.  Negative Carter's test.    Skin: Warm, dry, no rashes.  Extremities: No leg edema.  Pedal pulses 2+ symmetric  Psych:  Affect/mood is normal, judgement is good, memory is intact, grooming is appropriate.    Assessment/Plan:     Isabella was seen today for gynecologic exam.    Diagnoses and all orders for this visit:    Injury of right knee, initial encounter  -She was kicked by a horse about 3 weeks ago.  She still has moderate tenderness along the distal portion of the tibia.  Due to trauma will obtain x-ray of tib-fib, knee, and femur.  Further treatment as needed pending results.  If these are negative may consider soft tissue ultrasound if symptoms persist.  Advised RICE and ibuprofen as needed.  -     DX-KNEE 3 VIEWS RIGHT; Future  -     DX-FEMUR-2+ RIGHT; Future      Addendum 9:53 AM:   Other closed fracture of proximal end of right fibula, initial encounter   Results of x-ray showed a acute comminuted relatively nondisplaced fracture of the right fibular head.  These results have been discussed with the patient.  She has been referred to [facility] fracture orthopedic and spine.  - REFERRAL TO ORTHOPEDICS            Return if symptoms worsen or fail to improve.

## 2020-05-12 ENCOUNTER — HOSPITAL ENCOUNTER (OUTPATIENT)
Dept: LAB | Facility: MEDICAL CENTER | Age: 60
End: 2020-05-12
Attending: INTERNAL MEDICINE
Payer: COMMERCIAL

## 2020-05-12 DIAGNOSIS — Z00.00 HEALTH CARE MAINTENANCE: ICD-10-CM

## 2020-05-12 DIAGNOSIS — K90.0 CELIAC DISEASE: ICD-10-CM

## 2020-05-12 DIAGNOSIS — E55.9 VITAMIN D DEFICIENCY: ICD-10-CM

## 2020-05-12 DIAGNOSIS — E61.1 IRON DEFICIENCY: ICD-10-CM

## 2020-05-12 DIAGNOSIS — K52.9 CHRONIC DIARRHEA: ICD-10-CM

## 2020-05-12 LAB
25(OH)D3 SERPL-MCNC: 37 NG/ML (ref 30–100)
ALBUMIN SERPL BCP-MCNC: 4.2 G/DL (ref 3.2–4.9)
ALBUMIN/GLOB SERPL: 1.8 G/DL
ALP SERPL-CCNC: 94 U/L (ref 30–99)
ALT SERPL-CCNC: 22 U/L (ref 2–50)
ANION GAP SERPL CALC-SCNC: 13 MMOL/L (ref 7–16)
AST SERPL-CCNC: 22 U/L (ref 12–45)
BASOPHILS # BLD AUTO: 1.5 % (ref 0–1.8)
BASOPHILS # BLD: 0.06 K/UL (ref 0–0.12)
BILIRUB SERPL-MCNC: 0.4 MG/DL (ref 0.1–1.5)
BUN SERPL-MCNC: 19 MG/DL (ref 8–22)
CALCIUM SERPL-MCNC: 9.4 MG/DL (ref 8.5–10.5)
CHLORIDE SERPL-SCNC: 106 MMOL/L (ref 96–112)
CHOLEST SERPL-MCNC: 184 MG/DL (ref 100–199)
CO2 SERPL-SCNC: 22 MMOL/L (ref 20–33)
CREAT SERPL-MCNC: 0.62 MG/DL (ref 0.5–1.4)
EOSINOPHIL # BLD AUTO: 0.35 K/UL (ref 0–0.51)
EOSINOPHIL NFR BLD: 8.7 % (ref 0–6.9)
ERYTHROCYTE [DISTWIDTH] IN BLOOD BY AUTOMATED COUNT: 46.1 FL (ref 35.9–50)
FASTING STATUS PATIENT QL REPORTED: NORMAL
FERRITIN SERPL-MCNC: 221 NG/ML (ref 10–291)
FOLATE SERPL-MCNC: 16.1 NG/ML
GLOBULIN SER CALC-MCNC: 2.3 G/DL (ref 1.9–3.5)
GLUCOSE SERPL-MCNC: 97 MG/DL (ref 65–99)
HCT VFR BLD AUTO: 42.8 % (ref 37–47)
HDLC SERPL-MCNC: 77 MG/DL
HGB BLD-MCNC: 14 G/DL (ref 12–16)
IMM GRANULOCYTES # BLD AUTO: 0.01 K/UL (ref 0–0.11)
IMM GRANULOCYTES NFR BLD AUTO: 0.2 % (ref 0–0.9)
IRON SATN MFR SERPL: 51 % (ref 15–55)
IRON SERPL-MCNC: 136 UG/DL (ref 40–170)
LDLC SERPL CALC-MCNC: 83 MG/DL
LYMPHOCYTES # BLD AUTO: 1.43 K/UL (ref 1–4.8)
LYMPHOCYTES NFR BLD: 35.7 % (ref 22–41)
MCH RBC QN AUTO: 30.6 PG (ref 27–33)
MCHC RBC AUTO-ENTMCNC: 32.7 G/DL (ref 33.6–35)
MCV RBC AUTO: 93.4 FL (ref 81.4–97.8)
MONOCYTES # BLD AUTO: 0.3 K/UL (ref 0–0.85)
MONOCYTES NFR BLD AUTO: 7.5 % (ref 0–13.4)
NEUTROPHILS # BLD AUTO: 1.86 K/UL (ref 2–7.15)
NEUTROPHILS NFR BLD: 46.4 % (ref 44–72)
NRBC # BLD AUTO: 0 K/UL
NRBC BLD-RTO: 0 /100 WBC
PLATELET # BLD AUTO: 285 K/UL (ref 164–446)
PMV BLD AUTO: 9.7 FL (ref 9–12.9)
POTASSIUM SERPL-SCNC: 4.3 MMOL/L (ref 3.6–5.5)
PROT SERPL-MCNC: 6.5 G/DL (ref 6–8.2)
RBC # BLD AUTO: 4.58 M/UL (ref 4.2–5.4)
SODIUM SERPL-SCNC: 141 MMOL/L (ref 135–145)
TIBC SERPL-MCNC: 268 UG/DL (ref 250–450)
TRIGL SERPL-MCNC: 121 MG/DL (ref 0–149)
TSH SERPL DL<=0.005 MIU/L-ACNC: 1.42 UIU/ML (ref 0.38–5.33)
UIBC SERPL-MCNC: 132 UG/DL (ref 110–370)
VIT B12 SERPL-MCNC: 834 PG/ML (ref 211–911)
WBC # BLD AUTO: 4 K/UL (ref 4.8–10.8)

## 2020-05-12 PROCEDURE — 83550 IRON BINDING TEST: CPT

## 2020-05-12 PROCEDURE — 82607 VITAMIN B-12: CPT

## 2020-05-12 PROCEDURE — 85025 COMPLETE CBC W/AUTO DIFF WBC: CPT

## 2020-05-12 PROCEDURE — 80053 COMPREHEN METABOLIC PANEL: CPT

## 2020-05-12 PROCEDURE — 82746 ASSAY OF FOLIC ACID SERUM: CPT

## 2020-05-12 PROCEDURE — 82728 ASSAY OF FERRITIN: CPT

## 2020-05-12 PROCEDURE — 36415 COLL VENOUS BLD VENIPUNCTURE: CPT

## 2020-05-12 PROCEDURE — 83540 ASSAY OF IRON: CPT

## 2020-05-12 PROCEDURE — 80061 LIPID PANEL: CPT

## 2020-05-12 PROCEDURE — 82306 VITAMIN D 25 HYDROXY: CPT

## 2020-05-12 PROCEDURE — 84443 ASSAY THYROID STIM HORMONE: CPT

## 2020-05-14 ENCOUNTER — HOSPITAL ENCOUNTER (OUTPATIENT)
Facility: MEDICAL CENTER | Age: 60
End: 2020-05-14
Attending: INTERNAL MEDICINE
Payer: COMMERCIAL

## 2020-05-14 DIAGNOSIS — J30.9 CHRONIC ALLERGIC RHINITIS: ICD-10-CM

## 2020-05-14 PROCEDURE — 87177 OVA AND PARASITES SMEARS: CPT

## 2020-05-14 PROCEDURE — 87899 AGENT NOS ASSAY W/OPTIC: CPT

## 2020-05-14 PROCEDURE — 87045 FECES CULTURE AEROBIC BACT: CPT

## 2020-05-14 PROCEDURE — 87209 SMEAR COMPLEX STAIN: CPT

## 2020-05-14 PROCEDURE — 87046 STOOL CULTR AEROBIC BACT EA: CPT

## 2020-05-15 LAB
E COLI SXT1+2 STL IA: NORMAL
SIGNIFICANT IND 70042: NORMAL
SITE SITE: NORMAL
SOURCE SOURCE: NORMAL

## 2020-05-17 LAB
BACTERIA STL CULT: NORMAL
E COLI SXT1+2 STL IA: NORMAL
SIGNIFICANT IND 70042: NORMAL
SITE SITE: NORMAL
SOURCE SOURCE: NORMAL

## 2020-05-19 LAB — OVA AND PARASITE, FECAL INTERPRETATION Q0595: NEGATIVE

## 2020-05-22 ENCOUNTER — OFFICE VISIT (OUTPATIENT)
Dept: MEDICAL GROUP | Age: 60
End: 2020-05-22
Payer: COMMERCIAL

## 2020-05-22 VITALS
HEIGHT: 64 IN | BODY MASS INDEX: 26.67 KG/M2 | DIASTOLIC BLOOD PRESSURE: 88 MMHG | SYSTOLIC BLOOD PRESSURE: 122 MMHG | WEIGHT: 156.2 LBS | TEMPERATURE: 97.7 F | OXYGEN SATURATION: 97 % | HEART RATE: 58 BPM

## 2020-05-22 DIAGNOSIS — Z12.11 SCREENING FOR COLORECTAL CANCER: ICD-10-CM

## 2020-05-22 DIAGNOSIS — E53.8 VITAMIN B 12 DEFICIENCY: ICD-10-CM

## 2020-05-22 DIAGNOSIS — J30.9 CHRONIC ALLERGIC RHINITIS: ICD-10-CM

## 2020-05-22 DIAGNOSIS — Z00.00 HEALTHCARE MAINTENANCE: ICD-10-CM

## 2020-05-22 DIAGNOSIS — K42.9 UMBILICAL HERNIA WITHOUT OBSTRUCTION AND WITHOUT GANGRENE: ICD-10-CM

## 2020-05-22 DIAGNOSIS — K90.0 CELIAC DISEASE: ICD-10-CM

## 2020-05-22 DIAGNOSIS — Z12.31 ENCOUNTER FOR SCREENING MAMMOGRAM FOR BREAST CANCER: ICD-10-CM

## 2020-05-22 DIAGNOSIS — Z12.12 SCREENING FOR COLORECTAL CANCER: ICD-10-CM

## 2020-05-22 DIAGNOSIS — E55.9 VITAMIN D DEFICIENCY: ICD-10-CM

## 2020-05-22 DIAGNOSIS — E78.2 MIXED HYPERLIPIDEMIA: ICD-10-CM

## 2020-05-22 PROCEDURE — 99214 OFFICE O/P EST MOD 30 MIN: CPT | Performed by: INTERNAL MEDICINE

## 2020-05-22 RX ORDER — FLUTICASONE PROPIONATE 50 MCG
1 SPRAY, SUSPENSION (ML) NASAL DAILY
COMMUNITY
End: 2021-05-26

## 2020-05-22 ASSESSMENT — ENCOUNTER SYMPTOMS
MUSCULOSKELETAL NEGATIVE: 1
RESPIRATORY NEGATIVE: 1
CARDIOVASCULAR NEGATIVE: 1
CONSTITUTIONAL NEGATIVE: 1
GASTROINTESTINAL NEGATIVE: 1
PSYCHIATRIC NEGATIVE: 1
EYES NEGATIVE: 1
NEUROLOGICAL NEGATIVE: 1

## 2020-05-22 ASSESSMENT — PATIENT HEALTH QUESTIONNAIRE - PHQ9: CLINICAL INTERPRETATION OF PHQ2 SCORE: 0

## 2020-05-22 ASSESSMENT — FIBROSIS 4 INDEX: FIB4 SCORE: 0.97

## 2020-05-22 NOTE — PROGRESS NOTES
Subjective:      Isabella Strauss is a 59 y.o. female who presents with Lab Results  Patient is here for review of left recent lab and management of chronic medical conditions.  These include dyslipidemia without medications, celiac disease on gluten-free diet, umbilical hernia under surveillance, and chronic allergic rhinitis.  Since last visit she been doing well with no complaints.  She continues on Astelin and Flonase for her rhinitis which is well controlled.  She is been taking her B12 and D supplements for her celiac disease and review the labs shows he is very good levels.  Health maintenance reviews reveals that she is due for 10-year colonoscopy and annual mammogram.  Her Pap smear was done 3 years ago and she is on the five-year plan for Pap smears.  Her umbilical hernia is too small to operate at this point without taking 6 weeks off for which she cannot afford to do it is not causing her symptoms so she should think to watch for now.    No new symptoms problems or complaints.  No medications.  No medication allergies.          HPI    Review of Systems   Constitutional: Negative.    HENT: Negative.    Eyes: Negative.    Respiratory: Negative.    Cardiovascular: Negative.    Gastrointestinal: Negative.    Genitourinary: Negative.    Musculoskeletal: Negative.    Skin: Negative.    Neurological: Negative.    Endo/Heme/Allergies: Negative.    Psychiatric/Behavioral: Negative.      Outpatient Medications Prior to Visit   Medication Sig Dispense Refill   • fluticasone (FLONASE) 50 MCG/ACT nasal spray Spray 1 Spray in nose every day.     • Cholecalciferol (VITAMIN D3 ADULT GUMMIES PO) Take  by mouth.     • therapeutic multivitamin-minerals (THERAGRAN-M) Tab Take 1 Tab by mouth every day.     • aspirin EC (ECOTRIN) 81 MG Tablet Delayed Response Take 81 mg by mouth every day.     • azelastine (ASTELIN) 137 MCG/SPRAY nasal spray [place] 1 Spray in nose 2 times a day. (Patient not taking: Reported on 5/22/2020) 30 mL 11      No facility-administered medications prior to visit.      Allergies   Allergen Reactions   • Ivp Dye [Iodine] Anaphylaxis   • Seasonal      Hospital Outpatient Visit on 05/14/2020   Component Date Value   • Significant Indicator 05/14/2020 NEG    • Source 05/14/2020 STL    • Site 05/14/2020 STOOL    • Culture Result 05/14/2020                      Value:No enteric pathogens isolated.  NOTE:  Stool cultures are screened for Shiga Toxins 1 and 2,  Salmonella, Shigella, Campylobacter, Aeromonas,  Plesiomonas, and Vibrio.     • EHEC 05/14/2020 Negative for Shiga Toxin 1 and 2.    • Ova and Parasite, Fecal * 05/14/2020 Negative    • Significant Indicator 05/14/2020 NEG    • Source 05/14/2020 STL    • Site 05/14/2020 STOOL    • EHEC 05/14/2020 Negative for Shiga Toxin 1 and 2.    Hospital Outpatient Visit on 05/12/2020   Component Date Value   • 25-Hydroxy   Vitamin D 25 05/12/2020 37    • TSH 05/12/2020 1.420    • Sodium 05/12/2020 141    • Potassium 05/12/2020 4.3    • Chloride 05/12/2020 106    • Co2 05/12/2020 22    • Anion Gap 05/12/2020 13.0    • Glucose 05/12/2020 97    • Bun 05/12/2020 19    • Creatinine 05/12/2020 0.62    • Calcium 05/12/2020 9.4    • AST(SGOT) 05/12/2020 22    • ALT(SGPT) 05/12/2020 22    • Alkaline Phosphatase 05/12/2020 94    • Total Bilirubin 05/12/2020 0.4    • Albumin 05/12/2020 4.2    • Total Protein 05/12/2020 6.5    • Globulin 05/12/2020 2.3    • A-G Ratio 05/12/2020 1.8    • Cholesterol,Tot 05/12/2020 184    • Triglycerides 05/12/2020 121    • HDL 05/12/2020 77    • LDL 05/12/2020 83    • WBC 05/12/2020 4.0*   • RBC 05/12/2020 4.58    • Hemoglobin 05/12/2020 14.0    • Hematocrit 05/12/2020 42.8    • MCV 05/12/2020 93.4    • MCH 05/12/2020 30.6    • MCHC 05/12/2020 32.7*   • RDW 05/12/2020 46.1    • Platelet Count 05/12/2020 285    • MPV 05/12/2020 9.7    • Neutrophils-Polys 05/12/2020 46.40    • Lymphocytes 05/12/2020 35.70    • Monocytes 05/12/2020 7.50    • Eosinophils 05/12/2020  8.70*   • Basophils 05/12/2020 1.50    • Immature Granulocytes 05/12/2020 0.20    • Nucleated RBC 05/12/2020 0.00    • Neutrophils (Absolute) 05/12/2020 1.86*   • Lymphs (Absolute) 05/12/2020 1.43    • Monos (Absolute) 05/12/2020 0.30    • Eos (Absolute) 05/12/2020 0.35    • Baso (Absolute) 05/12/2020 0.06    • Immature Granulocytes (a* 05/12/2020 0.01    • NRBC (Absolute) 05/12/2020 0.00    • Iron 05/12/2020 136    • Total Iron Binding 05/12/2020 268    • Unsat Iron Binding 05/12/2020 132    • % Saturation 05/12/2020 51    • Ferritin 05/12/2020 221.0    • Folate -Folic Acid 05/12/2020 16.1    • Vitamin B12 -True Cobala* 05/12/2020 834    • Fasting Status 05/12/2020 Fasting    • GFR If  05/12/2020 >60    • GFR If Non  Ameri* 05/12/2020 >60       No results found for: HBA1C  Lab Results   Component Value Date/Time    SODIUM 141 05/12/2020 06:17 AM    POTASSIUM 4.3 05/12/2020 06:17 AM    CHLORIDE 106 05/12/2020 06:17 AM    CO2 22 05/12/2020 06:17 AM    GLUCOSE 97 05/12/2020 06:17 AM    BUN 19 05/12/2020 06:17 AM    CREATININE 0.62 05/12/2020 06:17 AM    BUNCREATRAT 16 05/13/2017 07:25 AM    ALKPHOSPHAT 94 05/12/2020 06:17 AM    ASTSGOT 22 05/12/2020 06:17 AM    ALTSGPT 22 05/12/2020 06:17 AM    TBILIRUBIN 0.4 05/12/2020 06:17 AM     No results found for: INR  Lab Results   Component Value Date/Time    CHOLSTRLTOT 184 05/12/2020 06:17 AM    LDL 83 05/12/2020 06:17 AM    HDL 77 05/12/2020 06:17 AM    TRIGLYCERIDE 121 05/12/2020 06:17 AM       No results found for: TESTOSTERONE  No results found for: TSH  No results found for: FREET4  No results found for: URICACID  No components found for: VITB12  Lab Results   Component Value Date/Time    25HYDROXY 37 05/12/2020 06:17 AM    25HYDROXY 40 10/09/2013 08:10 AM     Hospital Outpatient Visit on 05/14/2020   Component Date Value   • Significant Indicator 05/14/2020 NEG    • Source 05/14/2020 STL    • Site 05/14/2020 STOOL    • Culture Result 05/14/2020                       Value:No enteric pathogens isolated.  NOTE:  Stool cultures are screened for Shiga Toxins 1 and 2,  Salmonella, Shigella, Campylobacter, Aeromonas,  Plesiomonas, and Vibrio.     • EHEC 05/14/2020 Negative for Shiga Toxin 1 and 2.    • Ova and Parasite, Fecal * 05/14/2020 Negative    • Significant Indicator 05/14/2020 NEG    • Source 05/14/2020 STL    • Site 05/14/2020 STOOL    • EHEC 05/14/2020 Negative for Shiga Toxin 1 and 2.    Hospital Outpatient Visit on 05/12/2020   Component Date Value   • 25-Hydroxy   Vitamin D 25 05/12/2020 37    • TSH 05/12/2020 1.420    • Sodium 05/12/2020 141    • Potassium 05/12/2020 4.3    • Chloride 05/12/2020 106    • Co2 05/12/2020 22    • Anion Gap 05/12/2020 13.0    • Glucose 05/12/2020 97    • Bun 05/12/2020 19    • Creatinine 05/12/2020 0.62    • Calcium 05/12/2020 9.4    • AST(SGOT) 05/12/2020 22    • ALT(SGPT) 05/12/2020 22    • Alkaline Phosphatase 05/12/2020 94    • Total Bilirubin 05/12/2020 0.4    • Albumin 05/12/2020 4.2    • Total Protein 05/12/2020 6.5    • Globulin 05/12/2020 2.3    • A-G Ratio 05/12/2020 1.8    • Cholesterol,Tot 05/12/2020 184    • Triglycerides 05/12/2020 121    • HDL 05/12/2020 77    • LDL 05/12/2020 83    • WBC 05/12/2020 4.0*   • RBC 05/12/2020 4.58    • Hemoglobin 05/12/2020 14.0    • Hematocrit 05/12/2020 42.8    • MCV 05/12/2020 93.4    • MCH 05/12/2020 30.6    • MCHC 05/12/2020 32.7*   • RDW 05/12/2020 46.1    • Platelet Count 05/12/2020 285    • MPV 05/12/2020 9.7    • Neutrophils-Polys 05/12/2020 46.40    • Lymphocytes 05/12/2020 35.70    • Monocytes 05/12/2020 7.50    • Eosinophils 05/12/2020 8.70*   • Basophils 05/12/2020 1.50    • Immature Granulocytes 05/12/2020 0.20    • Nucleated RBC 05/12/2020 0.00    • Neutrophils (Absolute) 05/12/2020 1.86*   • Lymphs (Absolute) 05/12/2020 1.43    • Monos (Absolute) 05/12/2020 0.30    • Eos (Absolute) 05/12/2020 0.35    • Baso (Absolute) 05/12/2020 0.06    • Immature Granulocytes  "(a* 05/12/2020 0.01    • NRBC (Absolute) 05/12/2020 0.00    • Iron 05/12/2020 136    • Total Iron Binding 05/12/2020 268    • Unsat Iron Binding 05/12/2020 132    • % Saturation 05/12/2020 51    • Ferritin 05/12/2020 221.0    • Folate -Folic Acid 05/12/2020 16.1    • Vitamin B12 -True Cobala* 05/12/2020 834    • Fasting Status 05/12/2020 Fasting    • GFR If  05/12/2020 >60    • GFR If Non  Ameri* 05/12/2020 >60       No results found for: HBA1C  Lab Results   Component Value Date/Time    SODIUM 141 05/12/2020 06:17 AM    POTASSIUM 4.3 05/12/2020 06:17 AM    CHLORIDE 106 05/12/2020 06:17 AM    CO2 22 05/12/2020 06:17 AM    GLUCOSE 97 05/12/2020 06:17 AM    BUN 19 05/12/2020 06:17 AM    CREATININE 0.62 05/12/2020 06:17 AM    BUNCREATRAT 16 05/13/2017 07:25 AM    ALKPHOSPHAT 94 05/12/2020 06:17 AM    ASTSGOT 22 05/12/2020 06:17 AM    ALTSGPT 22 05/12/2020 06:17 AM    TBILIRUBIN 0.4 05/12/2020 06:17 AM     No results found for: INR  Lab Results   Component Value Date/Time    CHOLSTRLTOT 184 05/12/2020 06:17 AM    LDL 83 05/12/2020 06:17 AM    HDL 77 05/12/2020 06:17 AM    TRIGLYCERIDE 121 05/12/2020 06:17 AM       No results found for: TESTOSTERONE  No results found for: TSH  No results found for: FREET4  No results found for: URICACID  No components found for: VITB12  Lab Results   Component Value Date/Time    25HYDROXY 37 05/12/2020 06:17 AM    25HYDROXY 40 10/09/2013 08:10 AM        Objective:     /88 (BP Location: Left arm, Patient Position: Sitting, BP Cuff Size: Adult)   Pulse (!) 58   Temp 36.5 °C (97.7 °F)   Ht 1.626 m (5' 4\")   Wt 70.9 kg (156 lb 3.2 oz)   LMP  (LMP Unknown)   SpO2 97%   BMI 26.81 kg/m²      Physical Exam  Vitals signs reviewed.   Constitutional:       General: She is not in acute distress.     Appearance: She is well-developed. She is not diaphoretic.   HENT:      Head: Normocephalic and atraumatic.      Right Ear: External ear normal.      Left Ear: " External ear normal.      Nose: Nose normal.      Mouth/Throat:      Pharynx: No oropharyngeal exudate.   Eyes:      General: No scleral icterus.        Right eye: No discharge.         Left eye: No discharge.      Conjunctiva/sclera: Conjunctivae normal.      Pupils: Pupils are equal, round, and reactive to light.   Neck:      Musculoskeletal: Normal range of motion and neck supple.      Thyroid: No thyromegaly.      Vascular: No JVD.      Trachea: No tracheal deviation.   Cardiovascular:      Rate and Rhythm: Normal rate and regular rhythm.      Heart sounds: Normal heart sounds. No murmur. No friction rub. No gallop.    Pulmonary:      Effort: Pulmonary effort is normal. No respiratory distress.      Breath sounds: Normal breath sounds. No stridor. No wheezing or rales.   Chest:      Chest wall: No tenderness.   Abdominal:      General: Bowel sounds are normal. There is no distension.      Palpations: Abdomen is soft. There is no mass.      Tenderness: There is no abdominal tenderness. There is no guarding or rebound.   Musculoskeletal: Normal range of motion.         General: No tenderness.   Lymphadenopathy:      Cervical: No cervical adenopathy.   Skin:     General: Skin is warm and dry.      Coloration: Skin is not pale.      Findings: No erythema or rash.   Neurological:      Mental Status: She is alert and oriented to person, place, and time.      Cranial Nerves: No cranial nerve deficit.      Motor: No abnormal muscle tone.      Coordination: Coordination normal.      Deep Tendon Reflexes: Reflexes are normal and symmetric. Reflexes normal.   Psychiatric:         Behavior: Behavior normal.         Thought Content: Thought content normal.         Judgment: Judgment normal.     .lll          Assessment/Plan:       1. Mixed hyperlipidemia- mild no meds; hdl> 60    Under good control. Continue same regimen.    - Comp Metabolic Panel; Future  - Lipid Profile; Future  - CBC WITH DIFFERENTIAL; Future    2. Celiac  disease   Under good control. Continue same regimen.  Doing well on gluten-free diet.    - TSH; Future  - VITAMIN B12; Future  - VITAMIN D,25 HYDROXY; Future    3. Umbilical hernia without obstruction and without gangrene  Surveillance only.  We will return to her surgeon if it gets larger or bothers her.    4. Encounter for screening mammogram for breast cancer     - MA-SCREENING MAMMO BILAT W/CAD; Future    5. Screening for colorectal cancer     - REFERRAL TO GI FOR COLONOSCOPY    6. Vitamin B 12 deficiency   Under good control. Continue same regimen.    - VITAMIN B12; Future    7. Vitamin D deficiency   Under good control. Continue same regimen.    - VITAMIN D,25 HYDROXY; Future    8. Healthcare maintenance     - TSH; Future  - Comp Metabolic Panel; Future  - Lipid Profile; Future  - CBC WITH DIFFERENTIAL; Future    9. Chronic allergic rhinitis       Under good control. Continue same regimen.      Return in 1 year with labs for annual physical.  Pap smear in 2 years.  Call with mammogram and colonoscopy results.

## 2020-06-10 ENCOUNTER — HOSPITAL ENCOUNTER (OUTPATIENT)
Dept: RADIOLOGY | Facility: MEDICAL CENTER | Age: 60
End: 2020-06-10
Attending: INTERNAL MEDICINE
Payer: COMMERCIAL

## 2020-06-10 DIAGNOSIS — Z12.31 ENCOUNTER FOR SCREENING MAMMOGRAM FOR BREAST CANCER: ICD-10-CM

## 2020-06-10 PROCEDURE — 77067 SCR MAMMO BI INCL CAD: CPT

## 2021-03-15 DIAGNOSIS — Z23 NEED FOR VACCINATION: ICD-10-CM

## 2021-05-07 ENCOUNTER — HOSPITAL ENCOUNTER (OUTPATIENT)
Dept: LAB | Facility: MEDICAL CENTER | Age: 61
End: 2021-05-07
Attending: INTERNAL MEDICINE
Payer: COMMERCIAL

## 2021-05-07 DIAGNOSIS — E78.2 MIXED HYPERLIPIDEMIA: ICD-10-CM

## 2021-05-07 DIAGNOSIS — Z00.00 HEALTHCARE MAINTENANCE: ICD-10-CM

## 2021-05-07 DIAGNOSIS — K90.0 CELIAC DISEASE: ICD-10-CM

## 2021-05-07 DIAGNOSIS — E55.9 VITAMIN D DEFICIENCY: ICD-10-CM

## 2021-05-07 DIAGNOSIS — E53.8 VITAMIN B 12 DEFICIENCY: ICD-10-CM

## 2021-05-07 LAB
BASOPHILS # BLD AUTO: 1.6 % (ref 0–1.8)
BASOPHILS # BLD: 0.07 K/UL (ref 0–0.12)
EOSINOPHIL # BLD AUTO: 0.35 K/UL (ref 0–0.51)
EOSINOPHIL NFR BLD: 7.8 % (ref 0–6.9)
ERYTHROCYTE [DISTWIDTH] IN BLOOD BY AUTOMATED COUNT: 44.4 FL (ref 35.9–50)
HCT VFR BLD AUTO: 44.1 % (ref 37–47)
HGB BLD-MCNC: 14.2 G/DL (ref 12–16)
IMM GRANULOCYTES # BLD AUTO: 0 K/UL (ref 0–0.11)
IMM GRANULOCYTES NFR BLD AUTO: 0 % (ref 0–0.9)
LYMPHOCYTES # BLD AUTO: 1.45 K/UL (ref 1–4.8)
LYMPHOCYTES NFR BLD: 32.4 % (ref 22–41)
MCH RBC QN AUTO: 30.1 PG (ref 27–33)
MCHC RBC AUTO-ENTMCNC: 32.2 G/DL (ref 33.6–35)
MCV RBC AUTO: 93.4 FL (ref 81.4–97.8)
MONOCYTES # BLD AUTO: 0.34 K/UL (ref 0–0.85)
MONOCYTES NFR BLD AUTO: 7.6 % (ref 0–13.4)
NEUTROPHILS # BLD AUTO: 2.26 K/UL (ref 2–7.15)
NEUTROPHILS NFR BLD: 50.6 % (ref 44–72)
NRBC # BLD AUTO: 0 K/UL
NRBC BLD-RTO: 0 /100 WBC
PLATELET # BLD AUTO: 292 K/UL (ref 164–446)
PMV BLD AUTO: 9.5 FL (ref 9–12.9)
RBC # BLD AUTO: 4.72 M/UL (ref 4.2–5.4)
WBC # BLD AUTO: 4.5 K/UL (ref 4.8–10.8)

## 2021-05-07 PROCEDURE — 80053 COMPREHEN METABOLIC PANEL: CPT

## 2021-05-07 PROCEDURE — 84443 ASSAY THYROID STIM HORMONE: CPT

## 2021-05-07 PROCEDURE — 80061 LIPID PANEL: CPT

## 2021-05-07 PROCEDURE — 36415 COLL VENOUS BLD VENIPUNCTURE: CPT

## 2021-05-07 PROCEDURE — 85025 COMPLETE CBC W/AUTO DIFF WBC: CPT

## 2021-05-07 PROCEDURE — 82306 VITAMIN D 25 HYDROXY: CPT

## 2021-05-07 PROCEDURE — 82607 VITAMIN B-12: CPT

## 2021-05-08 LAB
25(OH)D3 SERPL-MCNC: 38 NG/ML (ref 30–80)
ALBUMIN SERPL BCP-MCNC: 4.1 G/DL (ref 3.2–4.9)
ALBUMIN/GLOB SERPL: 1.8 G/DL
ALP SERPL-CCNC: 102 U/L (ref 30–99)
ALT SERPL-CCNC: 15 U/L (ref 2–50)
ANION GAP SERPL CALC-SCNC: 7 MMOL/L (ref 7–16)
AST SERPL-CCNC: 23 U/L (ref 12–45)
BILIRUB SERPL-MCNC: 0.5 MG/DL (ref 0.1–1.5)
BUN SERPL-MCNC: 20 MG/DL (ref 8–22)
CALCIUM SERPL-MCNC: 9.6 MG/DL (ref 8.5–10.5)
CHLORIDE SERPL-SCNC: 109 MMOL/L (ref 96–112)
CHOLEST SERPL-MCNC: 166 MG/DL (ref 100–199)
CO2 SERPL-SCNC: 26 MMOL/L (ref 20–33)
CREAT SERPL-MCNC: 0.68 MG/DL (ref 0.5–1.4)
FASTING STATUS PATIENT QL REPORTED: NORMAL
GLOBULIN SER CALC-MCNC: 2.3 G/DL (ref 1.9–3.5)
GLUCOSE SERPL-MCNC: 88 MG/DL (ref 65–99)
HDLC SERPL-MCNC: 77 MG/DL
LDLC SERPL CALC-MCNC: 70 MG/DL
POTASSIUM SERPL-SCNC: 4.2 MMOL/L (ref 3.6–5.5)
PROT SERPL-MCNC: 6.4 G/DL (ref 6–8.2)
SODIUM SERPL-SCNC: 142 MMOL/L (ref 135–145)
TRIGL SERPL-MCNC: 94 MG/DL (ref 0–149)
TSH SERPL DL<=0.005 MIU/L-ACNC: 1.29 UIU/ML (ref 0.38–5.33)
VIT B12 SERPL-MCNC: 2481 PG/ML (ref 211–911)

## 2021-05-26 ENCOUNTER — OFFICE VISIT (OUTPATIENT)
Dept: MEDICAL GROUP | Age: 61
End: 2021-05-26
Payer: COMMERCIAL

## 2021-05-26 VITALS
TEMPERATURE: 98.1 F | HEART RATE: 54 BPM | WEIGHT: 156 LBS | OXYGEN SATURATION: 98 % | HEIGHT: 65 IN | BODY MASS INDEX: 25.99 KG/M2 | DIASTOLIC BLOOD PRESSURE: 78 MMHG | SYSTOLIC BLOOD PRESSURE: 122 MMHG

## 2021-05-26 DIAGNOSIS — E55.9 VITAMIN D DEFICIENCY: ICD-10-CM

## 2021-05-26 DIAGNOSIS — E53.8 VITAMIN B 12 DEFICIENCY: ICD-10-CM

## 2021-05-26 DIAGNOSIS — Z00.00 HEALTHCARE MAINTENANCE: ICD-10-CM

## 2021-05-26 DIAGNOSIS — Z12.31 SCREENING MAMMOGRAM, ENCOUNTER FOR: ICD-10-CM

## 2021-05-26 DIAGNOSIS — E78.2 MIXED HYPERLIPIDEMIA: ICD-10-CM

## 2021-05-26 DIAGNOSIS — K90.0 CELIAC DISEASE: ICD-10-CM

## 2021-05-26 DIAGNOSIS — Z23 NEED FOR VACCINATION: ICD-10-CM

## 2021-05-26 PROBLEM — Z82.3 FAMILY HISTORY OF CVA: Status: RESOLVED | Noted: 2019-05-31 | Resolved: 2021-05-26

## 2021-05-26 PROBLEM — K52.9 CHRONIC DIARRHEA: Status: RESOLVED | Noted: 2019-11-22 | Resolved: 2021-05-26

## 2021-05-26 PROBLEM — R53.82 CHRONIC FATIGUE: Status: RESOLVED | Noted: 2018-05-31 | Resolved: 2021-05-26

## 2021-05-26 PROBLEM — J30.9 CHRONIC ALLERGIC RHINITIS: Status: RESOLVED | Noted: 2019-11-22 | Resolved: 2021-05-26

## 2021-05-26 PROCEDURE — 99396 PREV VISIT EST AGE 40-64: CPT | Mod: 25 | Performed by: INTERNAL MEDICINE

## 2021-05-26 PROCEDURE — 90750 HZV VACC RECOMBINANT IM: CPT | Performed by: INTERNAL MEDICINE

## 2021-05-26 PROCEDURE — 90471 IMMUNIZATION ADMIN: CPT | Performed by: INTERNAL MEDICINE

## 2021-05-26 ASSESSMENT — ENCOUNTER SYMPTOMS
RESPIRATORY NEGATIVE: 1
NEUROLOGICAL NEGATIVE: 1
PSYCHIATRIC NEGATIVE: 1
CARDIOVASCULAR NEGATIVE: 1
CONSTITUTIONAL NEGATIVE: 1
GASTROINTESTINAL NEGATIVE: 1
MUSCULOSKELETAL NEGATIVE: 1
EYES NEGATIVE: 1

## 2021-05-26 ASSESSMENT — PATIENT HEALTH QUESTIONNAIRE - PHQ9: CLINICAL INTERPRETATION OF PHQ2 SCORE: 0

## 2021-05-26 ASSESSMENT — FIBROSIS 4 INDEX: FIB4 SCORE: 1.22

## 2021-05-26 NOTE — PROGRESS NOTES
"Subjective:      Ti Jessika Strauss is a 60 y.o. female who presents with Annual Exam  The patient is here for 1 year annual physical and follow-up of her ongoing problems of celiac disease borderline dyslipidemia vitamin B12 and vitamin D deficiencies to her celiac disease.      She is on no medications has no allergies is doing well.  She is recovered from her fibula fracture from a year ago.  Health maintenance indicates that she is due for Pap smear having been done 3-1/2 years ago most recently.  Had been on every other year mammogram scheduled but elected to go to yearly.  Always been negative for last 1 June 2020.  No new issues or concerns.  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          HPI    Review of Systems   Constitutional: Negative.    HENT: Negative.    Eyes: Negative.    Respiratory: Negative.    Cardiovascular: Negative.    Gastrointestinal: Negative.    Genitourinary: Negative.    Musculoskeletal: Negative.    Skin: Negative.    Neurological: Negative.    Endo/Heme/Allergies: Negative.    Psychiatric/Behavioral: Negative.           Objective:     /78 (BP Location: Left arm, Patient Position: Sitting, BP Cuff Size: Adult)   Pulse (!) 54   Temp 36.7 °C (98.1 °F) (Temporal)   Ht 1.651 m (5' 5\")   Wt 70.8 kg (156 lb)   LMP  (LMP Unknown)   SpO2 98%   BMI 25.96 kg/m²      Physical Exam  Vitals reviewed.   Constitutional:       General: She is not in acute distress.     Appearance: She is well-developed. She is not diaphoretic.   HENT:      Head: Normocephalic and atraumatic.      Right Ear: External ear normal.      Left Ear: External ear normal.      Nose: Nose normal.      Mouth/Throat:      Pharynx: No oropharyngeal exudate.   Eyes:      General: No scleral icterus.        Right eye: No discharge.         Left eye: No discharge.      Conjunctiva/sclera: Conjunctivae normal.      Pupils: Pupils are equal, round, and reactive to light.   Neck:      Thyroid: No thyromegaly. "      Vascular: No JVD.      Trachea: No tracheal deviation.   Cardiovascular:      Rate and Rhythm: Normal rate and regular rhythm.      Heart sounds: Normal heart sounds. No murmur heard.   No friction rub. No gallop.    Pulmonary:      Effort: Pulmonary effort is normal. No respiratory distress.      Breath sounds: Normal breath sounds. No stridor. No wheezing or rales.   Chest:      Chest wall: No tenderness.   Abdominal:      General: Bowel sounds are normal. There is no distension.      Palpations: Abdomen is soft. There is no mass.      Tenderness: There is no abdominal tenderness. There is no guarding or rebound.   Musculoskeletal:         General: No tenderness. Normal range of motion.      Cervical back: Normal range of motion and neck supple.   Lymphadenopathy:      Cervical: No cervical adenopathy.   Skin:     General: Skin is warm and dry.      Coloration: Skin is not pale.      Findings: No erythema or rash.   Neurological:      Mental Status: She is alert and oriented to person, place, and time.      Cranial Nerves: No cranial nerve deficit.      Motor: No abnormal muscle tone.      Coordination: Coordination normal.      Deep Tendon Reflexes: Reflexes are normal and symmetric. Reflexes normal.   Psychiatric:         Behavior: Behavior normal.         Thought Content: Thought content normal.         Judgment: Judgment normal.                        Assessment/Plan:        1. Healthcare maintenance     - TSH; Future  - Comp Metabolic Panel; Future  - Lipid Profile; Future  - CBC WITH DIFFERENTIAL; Future  - FERRITIN; Future  - VITAMIN D,25 HYDROXY; Future  - MA-SCREENING MAMMO BILAT W/CAD; Future    2. Mixed hyperlipidemia   Under good control. Continue same regimen.    - TSH; Future  - Comp Metabolic Panel; Future  - Lipid Profile; Future  - CBC WITH DIFFERENTIAL; Future    Under good control. Continue same regimen.  3. Celiac disease  Good control continue same regimen\    - FERRITIN; Future  -  VITAMIN D,25 HYDROXY; Future  Also check annual CBC CHEM panel and B12.  Continue with gluten-free diet.    4. Vitamin D deficiency  Under good control. Continue same regimen.       - VITAMIN D,25 HYDROXY; Future    5. Vitamin B 12 deficiency  Under good control. Continue same regimen.     - FERRITIN; Future    6. Screening mammogram, encounter for      - MA-SCREENING MAMMO BILAT W/CAD; Future    7. Need for vaccination     - Shingrix Vaccine

## 2021-06-22 ENCOUNTER — APPOINTMENT (OUTPATIENT)
Dept: RADIOLOGY | Facility: MEDICAL CENTER | Age: 61
End: 2021-06-22
Attending: INTERNAL MEDICINE
Payer: COMMERCIAL

## 2021-08-04 ENCOUNTER — HOSPITAL ENCOUNTER (OUTPATIENT)
Dept: RADIOLOGY | Facility: MEDICAL CENTER | Age: 61
End: 2021-08-04
Attending: INTERNAL MEDICINE
Payer: COMMERCIAL

## 2021-08-04 DIAGNOSIS — Z12.31 SCREENING MAMMOGRAM, ENCOUNTER FOR: ICD-10-CM

## 2021-08-04 DIAGNOSIS — Z00.00 HEALTHCARE MAINTENANCE: ICD-10-CM

## 2021-08-04 PROCEDURE — 77063 BREAST TOMOSYNTHESIS BI: CPT

## 2021-08-10 ENCOUNTER — APPOINTMENT (OUTPATIENT)
Dept: MEDICAL GROUP | Facility: PHYSICIAN GROUP | Age: 61
End: 2021-08-10
Payer: COMMERCIAL

## 2021-09-10 ENCOUNTER — OFFICE VISIT (OUTPATIENT)
Dept: URGENT CARE | Facility: CLINIC | Age: 61
End: 2021-09-10
Payer: COMMERCIAL

## 2021-09-10 VITALS
RESPIRATION RATE: 16 BRPM | TEMPERATURE: 98.2 F | HEART RATE: 60 BPM | SYSTOLIC BLOOD PRESSURE: 132 MMHG | WEIGHT: 156.7 LBS | OXYGEN SATURATION: 95 % | BODY MASS INDEX: 25.18 KG/M2 | HEIGHT: 66 IN | DIASTOLIC BLOOD PRESSURE: 78 MMHG

## 2021-09-10 DIAGNOSIS — L08.9 SOFT TISSUE INFECTION: ICD-10-CM
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
## 2021-09-10 PROCEDURE — 99213 OFFICE O/P EST LOW 20 MIN: CPT | Performed by: FAMILY MEDICINE

## 2021-09-10 RX ORDER — AMOXICILLIN AND CLAVULANATE POTASSIUM 875; 125 MG/1; MG/1
1 TABLET, FILM COATED ORAL 2 TIMES DAILY
Qty: 14 TABLET | Refills: 0 | Status: SHIPPED | OUTPATIENT
Start: 2021-09-10 | End: 2021-09-17

## 2021-09-10 ASSESSMENT — FIBROSIS 4 INDEX: FIB4 SCORE: 1.22

## 2021-09-17 ASSESSMENT — ENCOUNTER SYMPTOMS
VOMITING: 0
WEIGHT LOSS: 0
NAUSEA: 0
MYALGIAS: 0

## 2021-09-17 NOTE — PROGRESS NOTES
"Julio Strauss is a 60 y.o. female who presents with Cat Scratch (cat scratch to rt wrist this am, pain and swelling/knot)            Onset 9/10/2021 right wrist suspected scratch but cannot completely rule out bite. It does appear to be a puncture wound. No active bleeding. There early onset redness and swelling. No purulent discharge. No red streaking. No fever. No function or sensory loss. Does not suspect foreign body. Wound was irrigated extensively at home. No other aggravating or alleviating factors.      Review of Systems   Constitutional: Negative for malaise/fatigue and weight loss.   Gastrointestinal: Negative for nausea and vomiting.   Musculoskeletal: Negative for joint pain and myalgias.   Skin: Negative for itching and rash.              Objective     /78 (BP Location: Left arm, Patient Position: Sitting)   Pulse 60   Temp 36.8 °C (98.2 °F) (Temporal)   Resp 16   Ht 1.676 m (5' 6\")   Wt 71.1 kg (156 lb 11.2 oz)   LMP  (LMP Unknown)   SpO2 95%   BMI 25.29 kg/m²      Physical Exam  Skin:     General: Skin is warm and dry.      Comments: Right wrist puncture wound dorsal ulnar aspect without active bleeding. Expanding redness warmth and swelling approximately 2 cm from puncture. No lymphangitis or proximal lymphadenopathy.    Neurological:      Mental Status: She is alert.                             Assessment & Plan        1. Cat bite, initial encounter  amoxicillin-clavulanate (AUGMENTIN) 875-125 MG Tab   2. Soft tissue infection  amoxicillin-clavulanate (AUGMENTIN) 875-125 MG Tab     Differential diagnosis, natural history, supportive care, and indications for immediate follow-up discussed at length.                 "

## 2022-01-20 ENCOUNTER — OFFICE VISIT (OUTPATIENT)
Dept: URGENT CARE | Facility: CLINIC | Age: 62
End: 2022-01-20
Payer: COMMERCIAL

## 2022-01-20 VITALS
OXYGEN SATURATION: 98 % | DIASTOLIC BLOOD PRESSURE: 82 MMHG | WEIGHT: 149 LBS | BODY MASS INDEX: 24.05 KG/M2 | TEMPERATURE: 96.9 F | RESPIRATION RATE: 16 BRPM | SYSTOLIC BLOOD PRESSURE: 124 MMHG | HEART RATE: 71 BPM

## 2022-01-20 DIAGNOSIS — J45.909 REACTIVE AIRWAY DISEASE WITHOUT COMPLICATION, UNSPECIFIED ASTHMA SEVERITY, UNSPECIFIED WHETHER PERSISTENT: ICD-10-CM

## 2022-01-20 DIAGNOSIS — J22 LRTI (LOWER RESPIRATORY TRACT INFECTION): ICD-10-CM

## 2022-01-20 PROCEDURE — 99214 OFFICE O/P EST MOD 30 MIN: CPT | Performed by: PHYSICIAN ASSISTANT

## 2022-01-20 RX ORDER — ALBUTEROL SULFATE 90 UG/1
2 AEROSOL, METERED RESPIRATORY (INHALATION) EVERY 6 HOURS PRN
Qty: 8.5 G | Refills: 0 | Status: SHIPPED | OUTPATIENT
Start: 2022-01-20 | End: 2022-05-26 | Stop reason: SDUPTHER

## 2022-01-20 RX ORDER — AMOXICILLIN 500 MG/1
CAPSULE ORAL
COMMUNITY
Start: 2021-12-10 | End: 2022-01-20

## 2022-01-20 RX ORDER — PREDNISONE 20 MG/1
40 TABLET ORAL DAILY
Qty: 10 TABLET | Refills: 0 | Status: SHIPPED | OUTPATIENT
Start: 2022-01-20 | End: 2022-01-25

## 2022-01-20 RX ORDER — AMOXICILLIN AND CLAVULANATE POTASSIUM 500; 125 MG/1; MG/1
TABLET, FILM COATED ORAL
COMMUNITY
Start: 2021-12-21 | End: 2022-01-20

## 2022-01-20 RX ORDER — IBUPROFEN 600 MG/1
TABLET ORAL
COMMUNITY
Start: 2021-12-21 | End: 2022-01-20

## 2022-01-20 RX ORDER — AZITHROMYCIN 250 MG/1
TABLET, FILM COATED ORAL
Qty: 6 TABLET | Refills: 0 | Status: SHIPPED | OUTPATIENT
Start: 2022-01-20 | End: 2022-06-14

## 2022-01-20 ASSESSMENT — ENCOUNTER SYMPTOMS
SINUS PAIN: 0
VOMITING: 0
WHEEZING: 0
NAUSEA: 0
MYALGIAS: 0
CHILLS: 0
DIARRHEA: 0
SORE THROAT: 0
ABDOMINAL PAIN: 0
HEADACHES: 0
PALPITATIONS: 0
COUGH: 1
SHORTNESS OF BREATH: 0
FEVER: 0
DIZZINESS: 0
SPUTUM PRODUCTION: 0
DIAPHORESIS: 0

## 2022-01-20 ASSESSMENT — FIBROSIS 4 INDEX: FIB4 SCORE: 1.24

## 2022-01-20 NOTE — PROGRESS NOTES
Subjective:   Adán Strauss is a 61 y.o. female who presents for Cough (persistent x2-3 months )      HPI:  This is a very pleasant 61-year-old female presented to the clinic with a persistent cough x2 to 3 months.  Patient states she does not feel ill.  Denies any body aches or chills.  Has not been running a fever.  No shortness of breath or chest pain.  She states it feels like her cough is trying to be productive but she is not able to produce any sputum.  Seems to be aggravated by the cold weather in the morning.  Describes a childhood history of asthma.  Also has history of seasonal allergies especially to grasses.  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  She has tried various over-the-counter cough medications and expectorants with no improvement.  Has not smoked in 30 years.  No known ill contacts.    Review of Systems   Constitutional: Negative for chills, diaphoresis, fever and malaise/fatigue.   HENT: Positive for congestion. Negative for ear pain, sinus pain and sore throat.    Respiratory: Positive for cough. Negative for sputum production, shortness of breath and wheezing.    Cardiovascular: Negative for chest pain and palpitations.   Gastrointestinal: Negative for abdominal pain, diarrhea, nausea and vomiting.   Musculoskeletal: Negative for myalgias.   Neurological: Negative for dizziness and headaches.   Endo/Heme/Allergies: Positive for environmental allergies.       Medications:    • albuterol Aers  • amoxicillin Caps  • amoxicillin-clavulanate Tabs  • azithromycin Tabs  • ibuprofen Tabs  • predniSONE Tabs  • therapeutic multivitamin-minerals Tabs  • VITAMIN D3 ADULT GUMMIES PO    Allergies: Ivp dye [iodine] and Seasonal    Problem List: Adán Strauss does not have any pertinent problems on file.    Surgical History:  Past Surgical History:   Procedure Laterality Date   • OK REDUCTION OF BREAST  March 2014   • APPENDECTOMY     • DENTAL EXTRACTION(S)     • OVARIAN CYSTECTOMY      • TONSILLECTOMY AND ADENOIDECTOMY         Past Social Hx: Adán Strauss  reports that she has quit smoking. She has a 20.00 pack-year smoking history. She has never used smokeless tobacco. She reports that she does not drink alcohol and does not use drugs.     Past Family Hx:  Adán Strauss family history includes Cancer in her father; Diabetes in her maternal grandfather; Hyperlipidemia in her maternal grandmother and mother; Hypertension in her mother; Lung Disease in her father; Stroke (age of onset: 50) in her maternal grandmother; Stroke (age of onset: 58) in her mother.     Problem list, medications, and allergies reviewed by myself today in Epic.     Objective:     /82 (BP Location: Right arm, Patient Position: Sitting, BP Cuff Size: Adult)   Pulse 71   Temp 36.1 °C (96.9 °F) (Temporal)   Resp 16   Wt 67.6 kg (149 lb)   LMP  (LMP Unknown)   SpO2 98%   BMI 24.05 kg/m²     Physical Exam  Constitutional:       General: She is not in acute distress.     Appearance: Normal appearance. She is not ill-appearing, toxic-appearing or diaphoretic.   HENT:      Head: Normocephalic and atraumatic.      Right Ear: Tympanic membrane, ear canal and external ear normal.      Left Ear: Tympanic membrane, ear canal and external ear normal.      Nose: Congestion present. No rhinorrhea.      Mouth/Throat:      Mouth: Mucous membranes are moist.      Pharynx: No oropharyngeal exudate or posterior oropharyngeal erythema.   Eyes:      Conjunctiva/sclera: Conjunctivae normal.   Cardiovascular:      Rate and Rhythm: Normal rate and regular rhythm.      Pulses: Normal pulses.      Heart sounds: Normal heart sounds.   Pulmonary:      Effort: Pulmonary effort is normal.      Breath sounds: Wheezing present. No rhonchi or rales.      Comments: Faint expiratory wheezes in all fields.  Musculoskeletal:      Cervical back: Normal range of motion. No muscular tenderness.   Lymphadenopathy:      Cervical: No cervical adenopathy.    Skin:     General: Skin is warm and dry.      Capillary Refill: Capillary refill takes less than 2 seconds.   Neurological:      Mental Status: She is alert.   Psychiatric:         Mood and Affect: Mood normal.         Thought Content: Thought content normal.           Assessment/Plan:     Comments/MDM:     • Due to the nature and length of the patient's illness we will begin antibiotic and oral steroid at this time.  Patient is doing dealing with a cough for the last 2 to 3 months.  She otherwise feels well.  Describes a childhood history of asthma.  Also has a history of seasonal allergies.  Believe there is also likely a reactive airway component.  Do hear mild expiratory wheezing.  SPO2 98% on room air.  No known ill contacts.  All other vital signs are stable and within normal limits.  Encourage starting OTC antihistamine daily as well as Flonase for seasonal allergies.  Follow-up in clinic for any persistence or worsening of symptoms.  Call with questions or concerns.     Diagnosis and associated orders:     1. LRTI (lower respiratory tract infection)  predniSONE (DELTASONE) 20 MG Tab    albuterol 108 (90 Base) MCG/ACT Aero Soln inhalation aerosol    azithromycin (ZITHROMAX) 250 MG Tab   2. Reactive airway disease without complication, unspecified asthma severity, unspecified whether persistent  predniSONE (DELTASONE) 20 MG Tab    albuterol 108 (90 Base) MCG/ACT Aero Soln inhalation aerosol    azithromycin (ZITHROMAX) 250 MG Tab            Differential diagnosis, natural history, supportive care, and indications for immediate follow-up discussed.    I personally reviewed prior external notes and test results pertinent to today's visit.     Advised the patient to follow-up with the primary care physician for recheck, reevaluation, and consideration of further management.    Please note that this dictation was created using voice recognition software. I have made reasonable attempt to correct obvious errors,  but I expect that there are errors of grammar and possibly content that I did not discover before finalizing the note.    This note was electronically signed by JOSE JUAN Rivera PA-C

## 2022-05-05 ENCOUNTER — HOSPITAL ENCOUNTER (OUTPATIENT)
Dept: LAB | Facility: MEDICAL CENTER | Age: 62
End: 2022-05-05
Attending: INTERNAL MEDICINE
Payer: COMMERCIAL

## 2022-05-05 DIAGNOSIS — E53.8 VITAMIN B 12 DEFICIENCY: ICD-10-CM

## 2022-05-05 DIAGNOSIS — E78.2 MIXED HYPERLIPIDEMIA: ICD-10-CM

## 2022-05-05 DIAGNOSIS — Z00.00 HEALTHCARE MAINTENANCE: ICD-10-CM

## 2022-05-05 DIAGNOSIS — E55.9 VITAMIN D DEFICIENCY: ICD-10-CM

## 2022-05-05 DIAGNOSIS — K90.0 CELIAC DISEASE: ICD-10-CM

## 2022-05-05 LAB
25(OH)D3 SERPL-MCNC: 38 NG/ML (ref 30–100)
ALBUMIN SERPL BCP-MCNC: 4.3 G/DL (ref 3.2–4.9)
ALBUMIN/GLOB SERPL: 1.9 G/DL
ALP SERPL-CCNC: 111 U/L (ref 30–99)
ALT SERPL-CCNC: 9 U/L (ref 2–50)
ANION GAP SERPL CALC-SCNC: 10 MMOL/L (ref 7–16)
AST SERPL-CCNC: 13 U/L (ref 12–45)
BASOPHILS # BLD AUTO: 1.3 % (ref 0–1.8)
BASOPHILS # BLD: 0.07 K/UL (ref 0–0.12)
BILIRUB SERPL-MCNC: 0.7 MG/DL (ref 0.1–1.5)
BUN SERPL-MCNC: 23 MG/DL (ref 8–22)
CALCIUM SERPL-MCNC: 9.8 MG/DL (ref 8.5–10.5)
CHLORIDE SERPL-SCNC: 106 MMOL/L (ref 96–112)
CHOLEST SERPL-MCNC: 197 MG/DL (ref 100–199)
CO2 SERPL-SCNC: 25 MMOL/L (ref 20–33)
CREAT SERPL-MCNC: 0.61 MG/DL (ref 0.5–1.4)
EOSINOPHIL # BLD AUTO: 0.33 K/UL (ref 0–0.51)
EOSINOPHIL NFR BLD: 6 % (ref 0–6.9)
ERYTHROCYTE [DISTWIDTH] IN BLOOD BY AUTOMATED COUNT: 44.1 FL (ref 35.9–50)
FASTING STATUS PATIENT QL REPORTED: NORMAL
FERRITIN SERPL-MCNC: 284 NG/ML (ref 10–291)
GFR SERPLBLD CREATININE-BSD FMLA CKD-EPI: 101 ML/MIN/1.73 M 2
GLOBULIN SER CALC-MCNC: 2.3 G/DL (ref 1.9–3.5)
GLUCOSE SERPL-MCNC: 84 MG/DL (ref 65–99)
HCT VFR BLD AUTO: 44.2 % (ref 37–47)
HDLC SERPL-MCNC: 77 MG/DL
HGB BLD-MCNC: 14.6 G/DL (ref 12–16)
IMM GRANULOCYTES # BLD AUTO: 0.01 K/UL (ref 0–0.11)
IMM GRANULOCYTES NFR BLD AUTO: 0.2 % (ref 0–0.9)
LDLC SERPL CALC-MCNC: 104 MG/DL
LYMPHOCYTES # BLD AUTO: 1.61 K/UL (ref 1–4.8)
LYMPHOCYTES NFR BLD: 29.4 % (ref 22–41)
MCH RBC QN AUTO: 30.9 PG (ref 27–33)
MCHC RBC AUTO-ENTMCNC: 33 G/DL (ref 33.6–35)
MCV RBC AUTO: 93.4 FL (ref 81.4–97.8)
MONOCYTES # BLD AUTO: 0.44 K/UL (ref 0–0.85)
MONOCYTES NFR BLD AUTO: 8 % (ref 0–13.4)
NEUTROPHILS # BLD AUTO: 3.01 K/UL (ref 2–7.15)
NEUTROPHILS NFR BLD: 55.1 % (ref 44–72)
NRBC # BLD AUTO: 0 K/UL
NRBC BLD-RTO: 0 /100 WBC
PLATELET # BLD AUTO: 285 K/UL (ref 164–446)
PMV BLD AUTO: 9.3 FL (ref 9–12.9)
POTASSIUM SERPL-SCNC: 4.4 MMOL/L (ref 3.6–5.5)
PROT SERPL-MCNC: 6.6 G/DL (ref 6–8.2)
RBC # BLD AUTO: 4.73 M/UL (ref 4.2–5.4)
SODIUM SERPL-SCNC: 141 MMOL/L (ref 135–145)
TRIGL SERPL-MCNC: 78 MG/DL (ref 0–149)
TSH SERPL DL<=0.005 MIU/L-ACNC: 1.01 UIU/ML (ref 0.38–5.33)
WBC # BLD AUTO: 5.5 K/UL (ref 4.8–10.8)

## 2022-05-05 PROCEDURE — 80061 LIPID PANEL: CPT

## 2022-05-05 PROCEDURE — 84443 ASSAY THYROID STIM HORMONE: CPT

## 2022-05-05 PROCEDURE — 36415 COLL VENOUS BLD VENIPUNCTURE: CPT

## 2022-05-05 PROCEDURE — 82728 ASSAY OF FERRITIN: CPT

## 2022-05-05 PROCEDURE — 80053 COMPREHEN METABOLIC PANEL: CPT

## 2022-05-05 PROCEDURE — 85025 COMPLETE CBC W/AUTO DIFF WBC: CPT

## 2022-05-05 PROCEDURE — 82306 VITAMIN D 25 HYDROXY: CPT

## 2022-05-23 SDOH — HEALTH STABILITY: PHYSICAL HEALTH

## 2022-05-23 SDOH — ECONOMIC STABILITY: TRANSPORTATION INSECURITY

## 2022-05-23 SDOH — ECONOMIC STABILITY: HOUSING INSECURITY

## 2022-05-23 SDOH — HEALTH STABILITY: MENTAL HEALTH

## 2022-05-26 ENCOUNTER — OFFICE VISIT (OUTPATIENT)
Dept: MEDICAL GROUP | Age: 62
End: 2022-05-26
Payer: COMMERCIAL

## 2022-05-26 VITALS
SYSTOLIC BLOOD PRESSURE: 120 MMHG | WEIGHT: 156.8 LBS | TEMPERATURE: 96.5 F | RESPIRATION RATE: 16 BRPM | HEIGHT: 66 IN | OXYGEN SATURATION: 99 % | DIASTOLIC BLOOD PRESSURE: 84 MMHG | HEART RATE: 56 BPM | BODY MASS INDEX: 25.2 KG/M2

## 2022-05-26 DIAGNOSIS — Z00.00 HEALTHCARE MAINTENANCE: ICD-10-CM

## 2022-05-26 DIAGNOSIS — J45.909 REACTIVE AIRWAY DISEASE WITHOUT COMPLICATION, UNSPECIFIED ASTHMA SEVERITY, UNSPECIFIED WHETHER PERSISTENT: ICD-10-CM

## 2022-05-26 DIAGNOSIS — J22 LRTI (LOWER RESPIRATORY TRACT INFECTION): ICD-10-CM

## 2022-05-26 DIAGNOSIS — J20.9 ACUTE BRONCHITIS WITH WHEEZING: ICD-10-CM

## 2022-05-26 DIAGNOSIS — K90.0 CELIAC DISEASE: ICD-10-CM

## 2022-05-26 DIAGNOSIS — E78.2 MIXED HYPERLIPIDEMIA: ICD-10-CM

## 2022-05-26 DIAGNOSIS — J30.1 SEASONAL ALLERGIC RHINITIS DUE TO POLLEN: ICD-10-CM

## 2022-05-26 PROCEDURE — 99396 PREV VISIT EST AGE 40-64: CPT | Mod: 25 | Performed by: INTERNAL MEDICINE

## 2022-05-26 RX ORDER — FLUTICASONE PROPIONATE 50 MCG
2 SPRAY, SUSPENSION (ML) NASAL 2 TIMES DAILY
Qty: 116 G | Refills: 11 | Status: SHIPPED | OUTPATIENT
Start: 2022-05-26 | End: 2022-06-17

## 2022-05-26 RX ORDER — METHYLPREDNISOLONE SODIUM SUCCINATE 40 MG/ML
40 INJECTION, POWDER, LYOPHILIZED, FOR SOLUTION INTRAMUSCULAR; INTRAVENOUS ONCE
Status: COMPLETED | OUTPATIENT
Start: 2022-05-26 | End: 2022-05-26

## 2022-05-26 RX ORDER — ALBUTEROL SULFATE 90 UG/1
2 AEROSOL, METERED RESPIRATORY (INHALATION) EVERY 6 HOURS PRN
Qty: 8.5 G | Refills: 5 | Status: SHIPPED | OUTPATIENT
Start: 2022-05-26 | End: 2022-11-30 | Stop reason: SDUPTHER

## 2022-05-26 RX ORDER — AZITHROMYCIN 250 MG/1
TABLET, FILM COATED ORAL
Qty: 6 TABLET | Refills: 1 | Status: SHIPPED | OUTPATIENT
Start: 2022-05-26 | End: 2022-06-14

## 2022-05-26 RX ORDER — METHYLPREDNISOLONE 4 MG/1
TABLET ORAL
Qty: 21 TABLET | Refills: 0 | Status: SHIPPED | OUTPATIENT
Start: 2022-05-26 | End: 2022-06-14

## 2022-05-26 RX ADMIN — METHYLPREDNISOLONE SODIUM SUCCINATE 40 MG: 40 INJECTION, POWDER, LYOPHILIZED, FOR SOLUTION INTRAMUSCULAR; INTRAVENOUS at 07:36

## 2022-05-26 ASSESSMENT — ENCOUNTER SYMPTOMS
NEUROLOGICAL NEGATIVE: 1
EYES NEGATIVE: 1
MUSCULOSKELETAL NEGATIVE: 1
PSYCHIATRIC NEGATIVE: 1
RESPIRATORY NEGATIVE: 1
CARDIOVASCULAR NEGATIVE: 1
CONSTITUTIONAL NEGATIVE: 1
GASTROINTESTINAL NEGATIVE: 1

## 2022-05-26 ASSESSMENT — PATIENT HEALTH QUESTIONNAIRE - PHQ9: CLINICAL INTERPRETATION OF PHQ2 SCORE: 0

## 2022-05-26 ASSESSMENT — FIBROSIS 4 INDEX: FIB4 SCORE: 0.93

## 2022-05-26 NOTE — PROGRESS NOTES
Subjective     Adán Strauss is a 61 y.o. female who presents with Annual Exam (Lab review )  and  The patient is here for followup of chronic medical problems listed below. The patient is compliant with medications and having no side effects from them. Denies chest pain, abdominal pain, dyspnea, myalgias,      However since last visit she developed a severe flareup of her seasonal right allergies with clear rhinorrhea and itchy watery eyes and runny nose.  Spells of wheezing at night.  No fever chills chest pain or dyspnea.  She has been using her Flonase inhaler but with improvement symptoms.  Her albuterol Hailer does help her somewhat with her somewhat with her wheezing and chest congestion and      Patient Active Problem List    Diagnosis Date Noted   • Umbilical hernia without obstruction and without gangrene 11/22/2019   • Gastroesophageal reflux disease with esophagitis 10/26/2017   • Vaginal atrophy 06/15/2017   • Mixed hyperlipidemia- mild no meds; hdl> 60 05/23/2016   • Celiac disease 10/08/2013     Allergies   Allergen Reactions   • Ivp Dye [Iodine] Anaphylaxis   • Seasonal      Outpatient Medications Prior to Visit   Medication Sig Dispense Refill   • Cholecalciferol (VITAMIN D3 ADULT GUMMIES PO) Take  by mouth.     • therapeutic multivitamin-minerals (THERAGRAN-M) Tab Take 1 Tab by mouth every day.     • azithromycin (ZITHROMAX) 250 MG Tab Take 2 tablets PO on day one, then 1 tablet PO on day two to five. 6 Tablet 0   • albuterol 108 (90 Base) MCG/ACT Aero Soln inhalation aerosol Inhale 2 Puffs every 6 hours as needed for Shortness of Breath. 8.5 g 0     No facility-administered medications prior to visit.               HPI    Review of Systems   Constitutional: Negative.    HENT: Negative.    Eyes: Negative.    Respiratory: Negative.    Cardiovascular: Negative.    Gastrointestinal: Negative.    Genitourinary: Negative.    Musculoskeletal: Negative.    Skin: Negative.    Neurological: Negative.   "  Endo/Heme/Allergies: Negative.    Psychiatric/Behavioral: Negative.               Objective     /84 (BP Location: Right arm, Patient Position: Sitting, BP Cuff Size: Adult)   Pulse (!) 56   Temp 35.8 °C (96.5 °F) (Temporal)   Resp 16   Ht 1.676 m (5' 6\")   Wt 71.1 kg (156 lb 12.8 oz)   LMP  (LMP Unknown)   SpO2 99%   BMI 25.31 kg/m²      Physical Exam  Vitals reviewed.   Constitutional:       General: She is not in acute distress.     Appearance: She is well-developed. She is not diaphoretic.   HENT:      Head: Normocephalic and atraumatic.      Right Ear: External ear normal.      Left Ear: External ear normal.      Nose: Nose normal.      Mouth/Throat:      Pharynx: No oropharyngeal exudate.   Eyes:      General: No scleral icterus.        Right eye: No discharge.         Left eye: No discharge.      Conjunctiva/sclera: Conjunctivae normal.      Pupils: Pupils are equal, round, and reactive to light.   Neck:      Thyroid: No thyromegaly.      Vascular: No JVD.      Trachea: No tracheal deviation.   Cardiovascular:      Rate and Rhythm: Normal rate and regular rhythm.      Heart sounds: Normal heart sounds. No murmur heard.    No friction rub. No gallop.   Pulmonary:      Effort: Pulmonary effort is normal. No respiratory distress.      Breath sounds: Normal breath sounds. No stridor. No wheezing or rales.   Chest:      Chest wall: No tenderness.   Abdominal:      General: Bowel sounds are normal. There is no distension.      Palpations: Abdomen is soft. There is no mass.      Tenderness: There is no abdominal tenderness. There is no guarding or rebound.   Musculoskeletal:         General: No tenderness. Normal range of motion.      Cervical back: Normal range of motion and neck supple.   Lymphadenopathy:      Cervical: No cervical adenopathy.   Skin:     General: Skin is warm and dry.      Coloration: Skin is not pale.      Findings: No erythema or rash.   Neurological:      Mental Status: She is " alert and oriented to person, place, and time.      Cranial Nerves: No cranial nerve deficit.      Motor: No abnormal muscle tone.      Coordination: Coordination normal.      Deep Tendon Reflexes: Reflexes are normal and symmetric. Reflexes normal.   Psychiatric:         Behavior: Behavior normal.         Thought Content: Thought content normal.         Judgment: Judgment normal.                .  No results found for: HBA1C  Lab Results   Component Value Date/Time    SODIUM 141 05/05/2022 06:24 AM    POTASSIUM 4.4 05/05/2022 06:24 AM    CHLORIDE 106 05/05/2022 06:24 AM    CO2 25 05/05/2022 06:24 AM    GLUCOSE 84 05/05/2022 06:24 AM    BUN 23 (H) 05/05/2022 06:24 AM    CREATININE 0.61 05/05/2022 06:24 AM    BUNCREATRAT 16 05/13/2017 07:25 AM    ALKPHOSPHAT 111 (H) 05/05/2022 06:24 AM    ASTSGOT 13 05/05/2022 06:24 AM    ALTSGPT 9 05/05/2022 06:24 AM    TBILIRUBIN 0.7 05/05/2022 06:24 AM     No results found for: INR  Lab Results   Component Value Date/Time    CHOLSTRLTOT 197 05/05/2022 06:24 AM     (H) 05/05/2022 06:24 AM    HDL 77 05/05/2022 06:24 AM    TRIGLYCERIDE 78 05/05/2022 06:24 AM       No results found for: TESTOSTERONE  No results found for: TSH  No results found for: FREET4  No results found for: URICACID  No components found for: VITB12  Lab Results   Component Value Date/Time    25HYDROXY 38 05/05/2022 06:24 AM    25HYDROXY 38 05/07/2021 06:05 AM     Hospital Outpatient Visit on 05/05/2022   Component Date Value   • 25-Hydroxy   Vitamin D 25 05/05/2022 38    • Ferritin 05/05/2022 284.0    • WBC 05/05/2022 5.5    • RBC 05/05/2022 4.73    • Hemoglobin 05/05/2022 14.6    • Hematocrit 05/05/2022 44.2    • MCV 05/05/2022 93.4    • MCH 05/05/2022 30.9    • MCHC 05/05/2022 33.0 (A)   • RDW 05/05/2022 44.1    • Platelet Count 05/05/2022 285    • MPV 05/05/2022 9.3    • Neutrophils-Polys 05/05/2022 55.10    • Lymphocytes 05/05/2022 29.40    • Monocytes 05/05/2022 8.00    • Eosinophils 05/05/2022 6.00     • Basophils 05/05/2022 1.30    • Immature Granulocytes 05/05/2022 0.20    • Nucleated RBC 05/05/2022 0.00    • Neutrophils (Absolute) 05/05/2022 3.01    • Lymphs (Absolute) 05/05/2022 1.61    • Monos (Absolute) 05/05/2022 0.44    • Eos (Absolute) 05/05/2022 0.33    • Baso (Absolute) 05/05/2022 0.07    • Immature Granulocytes (a* 05/05/2022 0.01    • NRBC (Absolute) 05/05/2022 0.00    • Cholesterol,Tot 05/05/2022 197    • Triglycerides 05/05/2022 78    • HDL 05/05/2022 77    • LDL 05/05/2022 104 (A)   • Sodium 05/05/2022 141    • Potassium 05/05/2022 4.4    • Chloride 05/05/2022 106    • Co2 05/05/2022 25    • Anion Gap 05/05/2022 10.0    • Glucose 05/05/2022 84    • Bun 05/05/2022 23 (A)   • Creatinine 05/05/2022 0.61    • Calcium 05/05/2022 9.8    • AST(SGOT) 05/05/2022 13    • ALT(SGPT) 05/05/2022 9    • Alkaline Phosphatase 05/05/2022 111 (A)   • Total Bilirubin 05/05/2022 0.7    • Albumin 05/05/2022 4.3    • Total Protein 05/05/2022 6.6    • Globulin 05/05/2022 2.3    • A-G Ratio 05/05/2022 1.9    • TSH 05/05/2022 1.010    • Fasting Status 05/05/2022 Fasting    • GFR (CKD-EPI) 05/05/2022 101                    Assessment & Plan         1. Healthcare maintenance  All metrics up-to-date exams unremarkable except for occasional wheeze on forced expiration but does need Pap smear    2. Seasonal allergic rhinitis due to pollen      - methylPREDNISolone (SOLU-MEDROL) 40 MG injection 40 mg  - methylPREDNISolone (MEDROL DOSEPAK) 4 MG Tablet Therapy Pack; As directed on the packaging label.  Dispense: 21 Tablet; Refill: 0    3. Acute bronchitis with wheezing     - azithromycin (ZITHROMAX) 250 MG Tab; Take 2 tabs today then 1 per day for 4 days  Dispense: 6 Tablet; Refill: 1    4. Mixed hyperlipidemia- mild no meds; hdl> 60      Unclear good control.    Not severe enough to warrant medication.  Continue with Mediterranean diet.  Check labs annually    5. Celiac disease  Under good control on gluten-free diet.  Not  anemic.    6. LRTI (lower respiratory tract infection)     - albuterol 108 (90 Base) MCG/ACT Aero Soln inhalation aerosol; Inhale 2 Puffs every 6 hours as needed for Shortness of Breath.  Dispense: 8.5 g; Refill: 5    7. Reactive airway disease without complication, unspecified asthma severity, unspecified whether persistent     - albuterol 108 (90 Base) MCG/ACT Aero Soln inhalation aerosol; Inhale 2 Puffs every 6 hours as needed for Shortness of Breath.  Dispense: 8.5 g; Refill: 5

## 2022-06-14 ENCOUNTER — HOSPITAL ENCOUNTER (OUTPATIENT)
Facility: MEDICAL CENTER | Age: 62
End: 2022-06-14
Attending: PHYSICIAN ASSISTANT
Payer: COMMERCIAL

## 2022-06-14 ENCOUNTER — OFFICE VISIT (OUTPATIENT)
Dept: MEDICAL GROUP | Age: 62
End: 2022-06-14
Payer: COMMERCIAL

## 2022-06-14 VITALS
HEART RATE: 76 BPM | TEMPERATURE: 98.3 F | BODY MASS INDEX: 25.55 KG/M2 | DIASTOLIC BLOOD PRESSURE: 78 MMHG | HEIGHT: 66 IN | RESPIRATION RATE: 16 BRPM | WEIGHT: 159 LBS | SYSTOLIC BLOOD PRESSURE: 118 MMHG | OXYGEN SATURATION: 98 %

## 2022-06-14 DIAGNOSIS — Z12.31 VISIT FOR SCREENING MAMMOGRAM: ICD-10-CM

## 2022-06-14 DIAGNOSIS — Z01.419 WELL WOMAN EXAM WITH ROUTINE GYNECOLOGICAL EXAM: ICD-10-CM

## 2022-06-14 DIAGNOSIS — Z12.4 SCREENING FOR CERVICAL CANCER: ICD-10-CM

## 2022-06-14 PROCEDURE — 87624 HPV HI-RISK TYP POOLED RSLT: CPT

## 2022-06-14 PROCEDURE — 88175 CYTOPATH C/V AUTO FLUID REDO: CPT

## 2022-06-14 PROCEDURE — 99396 PREV VISIT EST AGE 40-64: CPT | Performed by: PHYSICIAN ASSISTANT

## 2022-06-14 ASSESSMENT — FIBROSIS 4 INDEX: FIB4 SCORE: 0.93

## 2022-06-14 NOTE — PROGRESS NOTES
Subjective:     CC:   Chief Complaint   Patient presents with   • Gynecologic Exam       HPI:   Adán Strauss is a 61 y.o. female who presents for pap    Patient has GYN provider: No   Last Pap Smear:  H/O Abnormal Pap: No  Last Mammogram: 2021  Received HPV series: Aged out    No LMP recorded (lmp unknown). Patient is postmenopausal.  She has not utilized hormone replacement therapy.  Denies any menopausal symptoms.  No significant bloating/fluid retention, pelvic pain, or dyspareunia. No abnormal vaginal discharge.   No breast tenderness, mass, nipple discharge or changes in size or contour.    OB History    Para Term  AB Living   2 2 2 0 0 2   SAB IAB Ectopic Molar Multiple Live Births   0 0 0 0 0 0      She  reports being sexually active and has had partner(s) who are male. She reports using the following method of birth control/protection: Post-Menopausal.    She  has a past medical history of Celiac disease, Chronic allergic rhinitis (2019), Chronic diarrhea (2019), Chronic fatigue (2018), Family history of colon cancer- in 2 great paternal uncles (2016), and Family history of CVA (2019).  She  has a past surgical history that includes pr breast reduction (2014); ovarian cystectomy; tonsillectomy and adenoidectomy; appendectomy; and dental extraction(s).    Family History   Problem Relation Age of Onset   • Stroke Mother 58        multiple   • Hypertension Mother    • Hyperlipidemia Mother    • Cancer Father    • Lung Disease Father    • Stroke Maternal Grandmother 50   • Hyperlipidemia Maternal Grandmother    • Diabetes Maternal Grandfather    • Heart Disease Neg Hx      Social History     Tobacco Use   • Smoking status: Former Smoker     Packs/day: 2.00     Years: 10.00     Pack years: 20.00   • Smokeless tobacco: Never Used   • Tobacco comment: quit smoking at 30 yrs old.   Vaping Use   • Vaping Use: Never used   Substance Use Topics   • Alcohol use: No     " Alcohol/week: 0.0 oz   • Drug use: No       Patient Active Problem List    Diagnosis Date Noted   • Umbilical hernia without obstruction and without gangrene 11/22/2019   • Gastroesophageal reflux disease with esophagitis 10/26/2017   • Vaginal atrophy 06/15/2017   • Mixed hyperlipidemia- mild no meds; hdl> 60 05/23/2016   • Celiac disease 10/08/2013     Current Outpatient Medications   Medication Sig Dispense Refill   • albuterol 108 (90 Base) MCG/ACT Aero Soln inhalation aerosol Inhale 2 Puffs every 6 hours as needed for Shortness of Breath. 8.5 g 5   • fluticasone (FLONASE) 50 MCG/ACT nasal spray Administer 2 Sprays into affected nostril(S) 2 times a day. 116 g 11   • Cholecalciferol (VITAMIN D3 ADULT GUMMIES PO) Take  by mouth.     • therapeutic multivitamin-minerals (THERAGRAN-M) Tab Take 1 Tab by mouth every day.       No current facility-administered medications for this visit.     Allergies   Allergen Reactions   • Ivp Dye [Iodine] Anaphylaxis   • Seasonal        Review of Systems   Constitutional: Negative for fever, chills and malaise/fatigue.   HENT: Negative for congestion.    Eyes: Negative for pain.   Respiratory: Negative for cough and shortness of breath.    Cardiovascular: Negative for chest pain and leg swelling.   Gastrointestinal: Negative for nausea, vomiting, abdominal pain and diarrhea.   Genitourinary: Negative for dysuria and hematuria.   Psychiatric/Behavioral: Negative for depression.  The patient is not nervous/anxious.      Objective:   /78 (BP Location: Left arm, Patient Position: Sitting, BP Cuff Size: Adult)   Pulse 76   Temp 36.8 °C (98.3 °F) (Temporal)   Resp 16   Ht 1.676 m (5' 6\")   Wt 72.1 kg (159 lb)   LMP  (LMP Unknown)   SpO2 98%   BMI 25.66 kg/m²     Wt Readings from Last 4 Encounters:   06/14/22 72.1 kg (159 lb)   05/26/22 71.1 kg (156 lb 12.8 oz)   01/20/22 67.6 kg (149 lb)   09/10/21 71.1 kg (156 lb 11.2 oz)       A chaperone was offered to the patient " during today's exam. Chaperone name: Luanne Melendez MA was present.    Physical Exam:  Constitutional: Well-developed and well-nourished. Not diaphoretic. No distress.   Skin: Skin is warm and dry. No rash noted.  Head: Atraumatic without lesions.  Eyes: Conjunctivae normal No scleral icterus.   Mouth/Throat: Tongue normal. Oropharynx is clear and moist. Posterior pharynx without erythema or exudates.  Neck: Supple, trachea midline. Normal range of motion. No thyromegaly present. No lymphadenopathy--cervical or supraclavicular.  Cardiovascular: Regular rate and rhythm, S1 and S2 without murmur, rubs, or gallops.    Respiratory: Effort normal. Clear to auscultation throughout. No adventitious sounds.   Breast: Breasts examined seated and supine. No skin changes, peau d'orange or nipple retraction. No discharge. No axillary or supraclavicular adenopathy. No masses or nodularity palpable.  Abdomen: Soft, non tender, and without distention. Active bowel sounds in all four quadrants. No rebound, guarding, masses or HSM.  : Perineum and external genitalia normal without rash. Vagina with normal and physiologic discharge. Cervix without visible lesions or discharge. Bimanual exam without adnexal masses or cervical motion tenderness.  Extremities: No cyanosis, clubbing, erythema, nor edema. Distal pulses intact and symmetric.   Neurological: Alert and oriented x 3. Grossly non-focal.  Psychiatric:  Behavior, mood, and affect are appropriate.    Assessment and Plan:     1. Well woman exam with routine gynecological exam  -Will call with results of Pap.  - THINPREP PAP WITH HPV; Future    2. Screening for cervical cancer  - THINPREP PAP WITH HPV; Future    3. Visit for screening mammogram  -Due for mammogram in August.  Order placed  - MA-SCREENING MAMMO BILAT W/TOMOSYNTHESIS W/CAD; Future      Health maintenance:    Labs per orders  Immunizations per orders  Patient counseled about skin care, diet, supplements, and  exercise.  Discussed  breast self exam, mammography screening, menopause, adequate intake of calcium and vitamin D     Follow-up: Return in about 6 months (around 12/14/2022) for Medication Check w/pcp.

## 2022-06-15 DIAGNOSIS — Z01.419 WELL WOMAN EXAM WITH ROUTINE GYNECOLOGICAL EXAM: ICD-10-CM

## 2022-06-15 DIAGNOSIS — Z12.4 SCREENING FOR CERVICAL CANCER: ICD-10-CM

## 2022-06-17 RX ORDER — FLUTICASONE PROPIONATE 50 MCG
2 SPRAY, SUSPENSION (ML) NASAL 2 TIMES DAILY
Qty: 16 ML | Refills: 95 | Status: SHIPPED | OUTPATIENT
Start: 2022-06-17 | End: 2022-11-30 | Stop reason: SDUPTHER

## 2022-06-17 NOTE — TELEPHONE ENCOUNTER
Received request via: Pharmacy    Was the patient seen in the last year in this department? Yes 6/14/22    Does the patient have an active prescription (recently filled or refills available) for medication(s) requested? No

## 2022-08-08 ENCOUNTER — HOSPITAL ENCOUNTER (OUTPATIENT)
Dept: RADIOLOGY | Facility: MEDICAL CENTER | Age: 62
End: 2022-08-08
Attending: PHYSICIAN ASSISTANT
Payer: COMMERCIAL

## 2022-08-08 DIAGNOSIS — Z12.31 VISIT FOR SCREENING MAMMOGRAM: ICD-10-CM

## 2022-08-08 PROCEDURE — 77063 BREAST TOMOSYNTHESIS BI: CPT

## 2022-09-27 ENCOUNTER — OFFICE VISIT (OUTPATIENT)
Dept: URGENT CARE | Facility: CLINIC | Age: 62
End: 2022-09-27
Payer: COMMERCIAL

## 2022-09-27 ENCOUNTER — APPOINTMENT (OUTPATIENT)
Dept: RADIOLOGY | Facility: IMAGING CENTER | Age: 62
End: 2022-09-27
Attending: FAMILY MEDICINE
Payer: COMMERCIAL

## 2022-09-27 VITALS
HEART RATE: 60 BPM | OXYGEN SATURATION: 97 % | HEIGHT: 65 IN | BODY MASS INDEX: 25.66 KG/M2 | RESPIRATION RATE: 14 BRPM | SYSTOLIC BLOOD PRESSURE: 110 MMHG | DIASTOLIC BLOOD PRESSURE: 80 MMHG | TEMPERATURE: 97.8 F | WEIGHT: 154 LBS

## 2022-09-27 DIAGNOSIS — R06.02 SHORTNESS OF BREATH: ICD-10-CM

## 2022-09-27 DIAGNOSIS — J32.9 RHINOSINUSITIS: ICD-10-CM

## 2022-09-27 DIAGNOSIS — R05.2 SUBACUTE COUGH: ICD-10-CM

## 2022-09-27 PROCEDURE — 99214 OFFICE O/P EST MOD 30 MIN: CPT | Performed by: FAMILY MEDICINE

## 2022-09-27 PROCEDURE — 71046 X-RAY EXAM CHEST 2 VIEWS: CPT | Mod: TC | Performed by: FAMILY MEDICINE

## 2022-09-27 RX ORDER — PREDNISONE 20 MG/1
40 TABLET ORAL DAILY
Qty: 10 TABLET | Refills: 0 | Status: SHIPPED | OUTPATIENT
Start: 2022-09-27 | End: 2022-10-02

## 2022-09-27 RX ORDER — DOXYCYCLINE HYCLATE 100 MG
100 TABLET ORAL 2 TIMES DAILY
Qty: 14 TABLET | Refills: 0 | Status: SHIPPED | OUTPATIENT
Start: 2022-09-27 | End: 2022-10-04

## 2022-09-27 RX ORDER — ALBUTEROL SULFATE 90 UG/1
2 AEROSOL, METERED RESPIRATORY (INHALATION) EVERY 4 HOURS PRN
Qty: 1 EACH | Refills: 0 | Status: SHIPPED | OUTPATIENT
Start: 2022-09-27 | End: 2023-12-05 | Stop reason: SDUPTHER

## 2022-09-27 ASSESSMENT — FIBROSIS 4 INDEX: FIB4 SCORE: 0.93

## 2022-09-27 ASSESSMENT — ENCOUNTER SYMPTOMS
MYALGIAS: 0
WEIGHT LOSS: 0
EYE DISCHARGE: 0
NAUSEA: 0
VOMITING: 0
EYE REDNESS: 0
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
## 2022-10-20 DIAGNOSIS — K21.00 GASTROESOPHAGEAL REFLUX DISEASE WITH ESOPHAGITIS WITHOUT HEMORRHAGE: ICD-10-CM

## 2022-10-20 DIAGNOSIS — E53.8 VITAMIN B 12 DEFICIENCY: ICD-10-CM

## 2022-10-20 DIAGNOSIS — R73.01 IFG (IMPAIRED FASTING GLUCOSE): ICD-10-CM

## 2022-10-20 DIAGNOSIS — E78.2 MIXED HYPERLIPIDEMIA: ICD-10-CM

## 2022-10-20 DIAGNOSIS — K90.0 CELIAC DISEASE: ICD-10-CM

## 2022-10-20 DIAGNOSIS — E61.1 IRON DEFICIENCY: ICD-10-CM

## 2022-10-20 DIAGNOSIS — E55.9 VITAMIN D DEFICIENCY: ICD-10-CM

## 2022-11-22 ENCOUNTER — HOSPITAL ENCOUNTER (OUTPATIENT)
Dept: LAB | Facility: MEDICAL CENTER | Age: 62
End: 2022-11-22
Attending: INTERNAL MEDICINE
Payer: COMMERCIAL

## 2022-11-22 DIAGNOSIS — R73.01 IFG (IMPAIRED FASTING GLUCOSE): ICD-10-CM

## 2022-11-22 DIAGNOSIS — K90.0 CELIAC DISEASE: ICD-10-CM

## 2022-11-22 DIAGNOSIS — E55.9 VITAMIN D DEFICIENCY: ICD-10-CM

## 2022-11-22 DIAGNOSIS — E61.1 IRON DEFICIENCY: ICD-10-CM

## 2022-11-22 DIAGNOSIS — E78.2 MIXED HYPERLIPIDEMIA: ICD-10-CM

## 2022-11-22 DIAGNOSIS — E53.8 VITAMIN B 12 DEFICIENCY: ICD-10-CM

## 2022-11-22 LAB
25(OH)D3 SERPL-MCNC: 43 NG/ML (ref 30–100)
ALBUMIN SERPL BCP-MCNC: 4.3 G/DL (ref 3.2–4.9)
ALBUMIN/GLOB SERPL: 1.7 G/DL
ALP SERPL-CCNC: 113 U/L (ref 30–99)
ALT SERPL-CCNC: 8 U/L (ref 2–50)
ANION GAP SERPL CALC-SCNC: 9 MMOL/L (ref 7–16)
AST SERPL-CCNC: 17 U/L (ref 12–45)
BASOPHILS # BLD AUTO: 1.1 % (ref 0–1.8)
BASOPHILS # BLD: 0.06 K/UL (ref 0–0.12)
BILIRUB SERPL-MCNC: 0.5 MG/DL (ref 0.1–1.5)
BUN SERPL-MCNC: 18 MG/DL (ref 8–22)
CALCIUM SERPL-MCNC: 9.8 MG/DL (ref 8.5–10.5)
CHLORIDE SERPL-SCNC: 104 MMOL/L (ref 96–112)
CHOLEST SERPL-MCNC: 181 MG/DL (ref 100–199)
CO2 SERPL-SCNC: 25 MMOL/L (ref 20–33)
CREAT SERPL-MCNC: 0.71 MG/DL (ref 0.5–1.4)
EOSINOPHIL # BLD AUTO: 0.56 K/UL (ref 0–0.51)
EOSINOPHIL NFR BLD: 10.2 % (ref 0–6.9)
ERYTHROCYTE [DISTWIDTH] IN BLOOD BY AUTOMATED COUNT: 45.2 FL (ref 35.9–50)
EST. AVERAGE GLUCOSE BLD GHB EST-MCNC: 114 MG/DL
FASTING STATUS PATIENT QL REPORTED: NORMAL
FERRITIN SERPL-MCNC: 290 NG/ML (ref 10–291)
FOLATE SERPL-MCNC: 14.4 NG/ML
GFR SERPLBLD CREATININE-BSD FMLA CKD-EPI: 96 ML/MIN/1.73 M 2
GLOBULIN SER CALC-MCNC: 2.5 G/DL (ref 1.9–3.5)
GLUCOSE SERPL-MCNC: 99 MG/DL (ref 65–99)
HBA1C MFR BLD: 5.6 % (ref 4–5.6)
HCT VFR BLD AUTO: 44 % (ref 37–47)
HDLC SERPL-MCNC: 73 MG/DL
HGB BLD-MCNC: 14.1 G/DL (ref 12–16)
IMM GRANULOCYTES # BLD AUTO: 0.01 K/UL (ref 0–0.11)
IMM GRANULOCYTES NFR BLD AUTO: 0.2 % (ref 0–0.9)
IRON SATN MFR SERPL: 42 % (ref 15–55)
IRON SERPL-MCNC: 120 UG/DL (ref 40–170)
LDLC SERPL CALC-MCNC: 85 MG/DL
LYMPHOCYTES # BLD AUTO: 1.49 K/UL (ref 1–4.8)
LYMPHOCYTES NFR BLD: 27.2 % (ref 22–41)
MCH RBC QN AUTO: 30.5 PG (ref 27–33)
MCHC RBC AUTO-ENTMCNC: 32 G/DL (ref 33.6–35)
MCV RBC AUTO: 95.2 FL (ref 81.4–97.8)
MONOCYTES # BLD AUTO: 0.5 K/UL (ref 0–0.85)
MONOCYTES NFR BLD AUTO: 9.1 % (ref 0–13.4)
NEUTROPHILS # BLD AUTO: 2.85 K/UL (ref 2–7.15)
NEUTROPHILS NFR BLD: 52.2 % (ref 44–72)
NRBC # BLD AUTO: 0 K/UL
NRBC BLD-RTO: 0 /100 WBC
PLATELET # BLD AUTO: 317 K/UL (ref 164–446)
PMV BLD AUTO: 9.5 FL (ref 9–12.9)
POTASSIUM SERPL-SCNC: 4.7 MMOL/L (ref 3.6–5.5)
PROT SERPL-MCNC: 6.8 G/DL (ref 6–8.2)
RBC # BLD AUTO: 4.62 M/UL (ref 4.2–5.4)
SODIUM SERPL-SCNC: 138 MMOL/L (ref 135–145)
TIBC SERPL-MCNC: 283 UG/DL (ref 250–450)
TRIGL SERPL-MCNC: 116 MG/DL (ref 0–149)
TSH SERPL DL<=0.005 MIU/L-ACNC: 0.81 UIU/ML (ref 0.38–5.33)
UIBC SERPL-MCNC: 163 UG/DL (ref 110–370)
VIT B12 SERPL-MCNC: 526 PG/ML (ref 211–911)
WBC # BLD AUTO: 5.5 K/UL (ref 4.8–10.8)

## 2022-11-22 PROCEDURE — 83036 HEMOGLOBIN GLYCOSYLATED A1C: CPT

## 2022-11-22 PROCEDURE — 82746 ASSAY OF FOLIC ACID SERUM: CPT

## 2022-11-22 PROCEDURE — 82728 ASSAY OF FERRITIN: CPT

## 2022-11-22 PROCEDURE — 83550 IRON BINDING TEST: CPT

## 2022-11-22 PROCEDURE — 80053 COMPREHEN METABOLIC PANEL: CPT

## 2022-11-22 PROCEDURE — 82306 VITAMIN D 25 HYDROXY: CPT

## 2022-11-22 PROCEDURE — 36415 COLL VENOUS BLD VENIPUNCTURE: CPT

## 2022-11-22 PROCEDURE — 80061 LIPID PANEL: CPT

## 2022-11-22 PROCEDURE — 83540 ASSAY OF IRON: CPT

## 2022-11-22 PROCEDURE — 84443 ASSAY THYROID STIM HORMONE: CPT

## 2022-11-22 PROCEDURE — 82607 VITAMIN B-12: CPT

## 2022-11-22 PROCEDURE — 85025 COMPLETE CBC W/AUTO DIFF WBC: CPT

## 2022-11-30 ENCOUNTER — OFFICE VISIT (OUTPATIENT)
Dept: MEDICAL GROUP | Age: 62
End: 2022-11-30
Payer: COMMERCIAL

## 2022-11-30 VITALS
BODY MASS INDEX: 23.78 KG/M2 | HEIGHT: 66 IN | HEART RATE: 58 BPM | DIASTOLIC BLOOD PRESSURE: 62 MMHG | WEIGHT: 148 LBS | TEMPERATURE: 98.3 F | RESPIRATION RATE: 16 BRPM | OXYGEN SATURATION: 97 % | SYSTOLIC BLOOD PRESSURE: 120 MMHG

## 2022-11-30 DIAGNOSIS — E78.2 MIXED HYPERLIPIDEMIA: ICD-10-CM

## 2022-11-30 DIAGNOSIS — J30.1 SEASONAL ALLERGIC RHINITIS DUE TO POLLEN: ICD-10-CM

## 2022-11-30 DIAGNOSIS — R73.01 IFG (IMPAIRED FASTING GLUCOSE): ICD-10-CM

## 2022-11-30 DIAGNOSIS — E55.9 VITAMIN D DEFICIENCY: ICD-10-CM

## 2022-11-30 DIAGNOSIS — J45.909 REACTIVE AIRWAY DISEASE WITHOUT COMPLICATION, UNSPECIFIED ASTHMA SEVERITY, UNSPECIFIED WHETHER PERSISTENT: ICD-10-CM

## 2022-11-30 DIAGNOSIS — K90.0 CELIAC DISEASE: ICD-10-CM

## 2022-11-30 DIAGNOSIS — Z00.00 HEALTHCARE MAINTENANCE: ICD-10-CM

## 2022-11-30 PROCEDURE — 99214 OFFICE O/P EST MOD 30 MIN: CPT | Performed by: INTERNAL MEDICINE

## 2022-11-30 RX ORDER — ALBUTEROL SULFATE 90 UG/1
2 AEROSOL, METERED RESPIRATORY (INHALATION) EVERY 6 HOURS PRN
Qty: 8.5 G | Refills: 5 | Status: SHIPPED | OUTPATIENT
Start: 2022-11-30 | End: 2023-05-30 | Stop reason: SDUPTHER

## 2022-11-30 RX ORDER — FLUTICASONE PROPIONATE 50 MCG
2 SPRAY, SUSPENSION (ML) NASAL 2 TIMES DAILY
Qty: 16 ML | Refills: 95 | Status: SHIPPED | OUTPATIENT
Start: 2022-11-30 | End: 2022-12-20

## 2022-11-30 ASSESSMENT — ENCOUNTER SYMPTOMS
RESPIRATORY NEGATIVE: 1
GASTROINTESTINAL NEGATIVE: 1
NEUROLOGICAL NEGATIVE: 1
CONSTITUTIONAL NEGATIVE: 1
MUSCULOSKELETAL NEGATIVE: 1
PSYCHIATRIC NEGATIVE: 1
CARDIOVASCULAR NEGATIVE: 1
EYES NEGATIVE: 1

## 2022-11-30 ASSESSMENT — FIBROSIS 4 INDEX: FIB4 SCORE: 1.18

## 2022-11-30 NOTE — PROGRESS NOTES
"Subjective     Adán Strauss is a 62 y.o. female who presents with Follow-Up (Medication refills / lab review )            HPI    ROS           Objective     /62 (BP Location: Right arm, Patient Position: Sitting, BP Cuff Size: Adult)   Pulse (!) 58   Temp 36.8 °C (98.3 °F) (Temporal)   Resp 16   Ht 1.676 m (5' 6\")   Wt 67.1 kg (148 lb)   LMP  (LMP Unknown)   SpO2 97%   BMI 23.89 kg/m²      Physical Exam                        Assessment & Plan        1. Mixed hyperlipidemia- mild no meds; hdl> 60  ***  - TSH; Future  - Comp Metabolic Panel; Future  - Lipid Profile; Future  - CBC WITH DIFFERENTIAL; Future    2. Celiac disease  ***    3. IFG (impaired fasting glucose)  ***  - HEMOGLOBIN A1C; Future    4. Vitamin D deficiency  ***  - VITAMIN D,25 HYDROXY (DEFICIENCY); Future    5. Seasonal allergic rhinitis due to pollen  ***                  "

## 2022-12-01 NOTE — PROGRESS NOTES
Subjective     Adán Strauss is a 62 y.o. female who presents with Follow-Up (Medication refills / lab review )  ,fu    Patient Active Problem List   Diagnosis    Celiac disease    Mixed hyperlipidemia- mild no meds; hdl> 60    Vaginal atrophy    Gastroesophageal reflux disease with esophagitis    Umbilical hernia without obstruction and without gangrene    Seasonal allergic rhinitis due to pollen    Reactive airway disease without complication     Outpatient Medications Prior to Visit   Medication Sig Dispense Refill    albuterol 108 (90 Base) MCG/ACT Aero Soln inhalation aerosol Inhale 2 Puffs every four hours as needed for Shortness of Breath. 1 Each 0    Cholecalciferol (VITAMIN D3 ADULT GUMMIES PO) Take  by mouth.      therapeutic multivitamin-minerals (THERAGRAN-M) Tab Take 1 Tab by mouth every day.      fluticasone (FLONASE) 50 MCG/ACT nasal spray ADMINISTER 2 SPRAYS INTO AFFECTED NOSTRIL(S) 2 TIMES A DAY. 16 mL 95    albuterol 108 (90 Base) MCG/ACT Aero Soln inhalation aerosol Inhale 2 Puffs every 6 hours as needed for Shortness of Breath. 8.5 g 5     No facility-administered medications prior to visit.     Allergies   Allergen Reactions    Ivp Dye [Iodine] Anaphylaxis    Seasonal      Hospital Outpatient Visit on 11/22/2022   Component Date Value    Folate -Folic Acid 11/22/2022 14.4     Vitamin B12 -True Cobala* 11/22/2022 526     Ferritin 11/22/2022 290.0     Iron 11/22/2022 120     Total Iron Binding 11/22/2022 283     Unsat Iron Binding 11/22/2022 163     % Saturation 11/22/2022 42     25-Hydroxy   Vitamin D 25 11/22/2022 43     Glycohemoglobin 11/22/2022 5.6     Est Avg Glucose 11/22/2022 114     WBC 11/22/2022 5.5     RBC 11/22/2022 4.62     Hemoglobin 11/22/2022 14.1     Hematocrit 11/22/2022 44.0     MCV 11/22/2022 95.2     MCH 11/22/2022 30.5     MCHC 11/22/2022 32.0 (L)     RDW 11/22/2022 45.2     Platelet Count 11/22/2022 317     MPV 11/22/2022 9.5     Neutrophils-Polys 11/22/2022 52.20      Lymphocytes 11/22/2022 27.20     Monocytes 11/22/2022 9.10     Eosinophils 11/22/2022 10.20 (H)     Basophils 11/22/2022 1.10     Immature Granulocytes 11/22/2022 0.20     Nucleated RBC 11/22/2022 0.00     Neutrophils (Absolute) 11/22/2022 2.85     Lymphs (Absolute) 11/22/2022 1.49     Monos (Absolute) 11/22/2022 0.50     Eos (Absolute) 11/22/2022 0.56 (H)     Baso (Absolute) 11/22/2022 0.06     Immature Granulocytes (a* 11/22/2022 0.01     NRBC (Absolute) 11/22/2022 0.00     Cholesterol,Tot 11/22/2022 181     Triglycerides 11/22/2022 116     HDL 11/22/2022 73     LDL 11/22/2022 85     Sodium 11/22/2022 138     Potassium 11/22/2022 4.7     Chloride 11/22/2022 104     Co2 11/22/2022 25     Anion Gap 11/22/2022 9.0     Glucose 11/22/2022 99     Bun 11/22/2022 18     Creatinine 11/22/2022 0.71     Calcium 11/22/2022 9.8     AST(SGOT) 11/22/2022 17     ALT(SGPT) 11/22/2022 8     Alkaline Phosphatase 11/22/2022 113 (H)     Total Bilirubin 11/22/2022 0.5     Albumin 11/22/2022 4.3     Total Protein 11/22/2022 6.8     Globulin 11/22/2022 2.5     A-G Ratio 11/22/2022 1.7     TSH 11/22/2022 0.810     Fasting Status 11/22/2022 Fasting     GFR (CKD-EPI) 11/22/2022 96       Lab Results   Component Value Date/Time    HBA1C 5.6 11/22/2022 07:02 AM     Lab Results   Component Value Date/Time    SODIUM 138 11/22/2022 07:02 AM    POTASSIUM 4.7 11/22/2022 07:02 AM    CHLORIDE 104 11/22/2022 07:02 AM    CO2 25 11/22/2022 07:02 AM    GLUCOSE 99 11/22/2022 07:02 AM    BUN 18 11/22/2022 07:02 AM    CREATININE 0.71 11/22/2022 07:02 AM    BUNCREATRAT 16 05/13/2017 07:25 AM    ALKPHOSPHAT 113 (H) 11/22/2022 07:02 AM    ASTSGOT 17 11/22/2022 07:02 AM    ALTSGPT 8 11/22/2022 07:02 AM    TBILIRUBIN 0.5 11/22/2022 07:02 AM     No results found for: INR  Lab Results   Component Value Date/Time    CHOLSTRLTOT 181 11/22/2022 07:02 AM    LDL 85 11/22/2022 07:02 AM    HDL 73 11/22/2022 07:02 AM    TRIGLYCERIDE 116 11/22/2022 07:02 AM       No  "results found for: TESTOSTERONE  No results found for: TSH  No results found for: FREET4  No results found for: URICACID  No components found for: VITB12  Lab Results   Component Value Date/Time    25HYDROXY 43 11/22/2022 07:02 AM    25HYDROXY 38 05/05/2022 06:24 AM       '            HPI    Review of Systems   Constitutional: Negative.    HENT: Negative.     Eyes: Negative.    Respiratory: Negative.     Cardiovascular: Negative.    Gastrointestinal: Negative.    Genitourinary: Negative.    Musculoskeletal: Negative.    Skin: Negative.    Neurological: Negative.    Endo/Heme/Allergies: Negative.    Psychiatric/Behavioral: Negative.              Objective     /62 (BP Location: Right arm, Patient Position: Sitting, BP Cuff Size: Adult)   Pulse (!) 58   Temp 36.8 °C (98.3 °F) (Temporal)   Resp 16   Ht 1.676 m (5' 6\")   Wt 67.1 kg (148 lb)   LMP  (LMP Unknown)   SpO2 97%   BMI 23.89 kg/m²      Physical Exam  Vitals reviewed.   Constitutional:       General: She is not in acute distress.     Appearance: She is well-developed. She is not diaphoretic.   HENT:      Head: Normocephalic and atraumatic.      Right Ear: External ear normal.      Left Ear: External ear normal.      Nose: Nose normal.      Mouth/Throat:      Pharynx: No oropharyngeal exudate.   Eyes:      General: No scleral icterus.        Right eye: No discharge.         Left eye: No discharge.      Conjunctiva/sclera: Conjunctivae normal.      Pupils: Pupils are equal, round, and reactive to light.   Neck:      Thyroid: No thyromegaly.      Vascular: No JVD.      Trachea: No tracheal deviation.   Cardiovascular:      Rate and Rhythm: Normal rate and regular rhythm.      Heart sounds: Normal heart sounds. No murmur heard.    No friction rub. No gallop.   Pulmonary:      Effort: Pulmonary effort is normal. No respiratory distress.      Breath sounds: Normal breath sounds. No stridor. No wheezing or rales.   Chest:      Chest wall: No tenderness. "   Abdominal:      General: Bowel sounds are normal. There is no distension.      Palpations: Abdomen is soft. There is no mass.      Tenderness: There is no abdominal tenderness. There is no guarding or rebound.   Musculoskeletal:         General: No tenderness. Normal range of motion.      Cervical back: Normal range of motion and neck supple.   Lymphadenopathy:      Cervical: No cervical adenopathy.   Skin:     General: Skin is warm and dry.      Coloration: Skin is not pale.      Findings: No erythema or rash.   Neurological:      Mental Status: She is alert and oriented to person, place, and time.      Cranial Nerves: No cranial nerve deficit.      Motor: No abnormal muscle tone.      Coordination: Coordination normal.      Deep Tendon Reflexes: Reflexes are normal and symmetric. Reflexes normal.   Psychiatric:         Behavior: Behavior normal.         Thought Content: Thought content normal.         Judgment: Judgment normal.                           Assessment & Plan        1. Mixed hyperlipidemia- mild no meds; hdl> 60   Under good control. Continue same regimen.'  Mediterranean diet and exercise  - TSH; Future  - Comp Metabolic Panel; Future  - Lipid Profile; Future  - CBC WITH DIFFERENTIAL; Future    2. Celiac disease    Under good control. Continue same regimen.  Gluten-free diet.  Check iron B12 levels    3. IFG (impaired fasting glucose)    Under good control. Continue same regimen.  Monitoring diet and exercise  - HEMOGLOBIN A1C; Future    4. Vitamin D deficiency    Under good control. Continue same regimen.  - VITAMIN D,25 HYDROXY (DEFICIENCY); Future    5. Seasonal allergic rhinitis due to pollen        Under good control. Continue same regimen.  - fluticasone (FLONASE) 50 MCG/ACT nasal spray; Administer 2 Sprays into affected nostril(S) 2 times a day.  Dispense: 16 mL; Refill: 95    6. Reactive airway disease without complication, unspecified asthma severity, unspec Under good control. Continue  same regimen.ified whether persistent     - albuterol 108 (90 Base) MCG/ACT Aero Soln inhalation aerosol; Inhale 2 Puffs every 6 hours as needed for Shortness of Breath.  Dispense: 8.5 g; Refill: 5    7. Healthcare maintenanc  Advised on getting COVID booster and flu vaccines and pneumonia vaccines

## 2022-12-16 DIAGNOSIS — J30.1 SEASONAL ALLERGIC RHINITIS DUE TO POLLEN: ICD-10-CM

## 2022-12-16 DIAGNOSIS — J45.909 REACTIVE AIRWAY DISEASE WITHOUT COMPLICATION, UNSPECIFIED ASTHMA SEVERITY, UNSPECIFIED WHETHER PERSISTENT: ICD-10-CM

## 2022-12-16 DIAGNOSIS — J20.9 ACUTE BRONCHITIS WITH WHEEZING: ICD-10-CM

## 2022-12-20 RX ORDER — FLUTICASONE PROPIONATE 50 MCG
SPRAY, SUSPENSION (ML) NASAL
Qty: 96 ML | Refills: 16 | Status: SHIPPED | OUTPATIENT
Start: 2022-12-20 | End: 2023-05-30 | Stop reason: SDUPTHER

## 2022-12-20 NOTE — TELEPHONE ENCOUNTER
Pharmacy comment: REQUEST FOR 90 DAYS PRESCRIPTION. DX Code Needed.    Received request via: Pharmacy    Was the patient seen in the last year in this department? Yes    Does the patient have an active prescription (recently filled or refills available) for medication(s) requested? No    Does the patient have [facility] and need 100 day supply (blood pressure, diabetes and cholesterol meds only)? Patient does not have SCP

## 2023-05-12 ENCOUNTER — HOSPITAL ENCOUNTER (OUTPATIENT)
Dept: LAB | Facility: MEDICAL CENTER | Age: 63
End: 2023-05-12
Attending: INTERNAL MEDICINE
Payer: COMMERCIAL

## 2023-05-12 DIAGNOSIS — E78.2 MIXED HYPERLIPIDEMIA: ICD-10-CM

## 2023-05-12 DIAGNOSIS — R73.01 IFG (IMPAIRED FASTING GLUCOSE): ICD-10-CM

## 2023-05-12 DIAGNOSIS — E55.9 VITAMIN D DEFICIENCY: ICD-10-CM

## 2023-05-12 LAB
25(OH)D3 SERPL-MCNC: 44 NG/ML (ref 30–100)
ALBUMIN SERPL BCP-MCNC: 4.2 G/DL (ref 3.2–4.9)
ALBUMIN/GLOB SERPL: 1.6 G/DL
ALP SERPL-CCNC: 123 U/L (ref 30–99)
ALT SERPL-CCNC: 18 U/L (ref 2–50)
ANION GAP SERPL CALC-SCNC: 13 MMOL/L (ref 7–16)
AST SERPL-CCNC: 22 U/L (ref 12–45)
BASOPHILS # BLD AUTO: 1.1 % (ref 0–1.8)
BASOPHILS # BLD: 0.07 K/UL (ref 0–0.12)
BILIRUB SERPL-MCNC: 0.8 MG/DL (ref 0.1–1.5)
BUN SERPL-MCNC: 25 MG/DL (ref 8–22)
CALCIUM ALBUM COR SERPL-MCNC: 9.4 MG/DL (ref 8.5–10.5)
CALCIUM SERPL-MCNC: 9.6 MG/DL (ref 8.5–10.5)
CHLORIDE SERPL-SCNC: 104 MMOL/L (ref 96–112)
CHOLEST SERPL-MCNC: 186 MG/DL (ref 100–199)
CO2 SERPL-SCNC: 23 MMOL/L (ref 20–33)
CREAT SERPL-MCNC: 0.59 MG/DL (ref 0.5–1.4)
EOSINOPHIL # BLD AUTO: 0.32 K/UL (ref 0–0.51)
EOSINOPHIL NFR BLD: 5.2 % (ref 0–6.9)
ERYTHROCYTE [DISTWIDTH] IN BLOOD BY AUTOMATED COUNT: 45.8 FL (ref 35.9–50)
EST. AVERAGE GLUCOSE BLD GHB EST-MCNC: 111 MG/DL
FASTING STATUS PATIENT QL REPORTED: NORMAL
GFR SERPLBLD CREATININE-BSD FMLA CKD-EPI: 101 ML/MIN/1.73 M 2
GLOBULIN SER CALC-MCNC: 2.6 G/DL (ref 1.9–3.5)
GLUCOSE SERPL-MCNC: 89 MG/DL (ref 65–99)
HBA1C MFR BLD: 5.5 % (ref 4–5.6)
HCT VFR BLD AUTO: 46 % (ref 37–47)
HDLC SERPL-MCNC: 80 MG/DL
HGB BLD-MCNC: 14.7 G/DL (ref 12–16)
IMM GRANULOCYTES # BLD AUTO: 0.02 K/UL (ref 0–0.11)
IMM GRANULOCYTES NFR BLD AUTO: 0.3 % (ref 0–0.9)
LDLC SERPL CALC-MCNC: 89 MG/DL
LYMPHOCYTES # BLD AUTO: 1.88 K/UL (ref 1–4.8)
LYMPHOCYTES NFR BLD: 30.7 % (ref 22–41)
MCH RBC QN AUTO: 30.2 PG (ref 27–33)
MCHC RBC AUTO-ENTMCNC: 32 G/DL (ref 33.6–35)
MCV RBC AUTO: 94.5 FL (ref 81.4–97.8)
MONOCYTES # BLD AUTO: 0.51 K/UL (ref 0–0.85)
MONOCYTES NFR BLD AUTO: 8.3 % (ref 0–13.4)
NEUTROPHILS # BLD AUTO: 3.33 K/UL (ref 2–7.15)
NEUTROPHILS NFR BLD: 54.4 % (ref 44–72)
NRBC # BLD AUTO: 0 K/UL
NRBC BLD-RTO: 0 /100 WBC
PLATELET # BLD AUTO: 317 K/UL (ref 164–446)
PMV BLD AUTO: 9.7 FL (ref 9–12.9)
POTASSIUM SERPL-SCNC: 4.4 MMOL/L (ref 3.6–5.5)
PROT SERPL-MCNC: 6.8 G/DL (ref 6–8.2)
RBC # BLD AUTO: 4.87 M/UL (ref 4.2–5.4)
SODIUM SERPL-SCNC: 140 MMOL/L (ref 135–145)
TRIGL SERPL-MCNC: 84 MG/DL (ref 0–149)
TSH SERPL DL<=0.005 MIU/L-ACNC: 1.26 UIU/ML (ref 0.38–5.33)
WBC # BLD AUTO: 6.1 K/UL (ref 4.8–10.8)

## 2023-05-12 PROCEDURE — 83036 HEMOGLOBIN GLYCOSYLATED A1C: CPT

## 2023-05-12 PROCEDURE — 84443 ASSAY THYROID STIM HORMONE: CPT

## 2023-05-12 PROCEDURE — 80061 LIPID PANEL: CPT

## 2023-05-12 PROCEDURE — 80053 COMPREHEN METABOLIC PANEL: CPT

## 2023-05-12 PROCEDURE — 82306 VITAMIN D 25 HYDROXY: CPT

## 2023-05-12 PROCEDURE — 36415 COLL VENOUS BLD VENIPUNCTURE: CPT

## 2023-05-12 PROCEDURE — 85025 COMPLETE CBC W/AUTO DIFF WBC: CPT

## 2023-05-30 ENCOUNTER — OFFICE VISIT (OUTPATIENT)
Dept: MEDICAL GROUP | Age: 63
End: 2023-05-30
Payer: COMMERCIAL

## 2023-05-30 VITALS
OXYGEN SATURATION: 98 % | TEMPERATURE: 98.2 F | HEIGHT: 65 IN | HEART RATE: 60 BPM | SYSTOLIC BLOOD PRESSURE: 108 MMHG | BODY MASS INDEX: 24.63 KG/M2 | DIASTOLIC BLOOD PRESSURE: 70 MMHG

## 2023-05-30 DIAGNOSIS — E78.2 MIXED HYPERLIPIDEMIA: ICD-10-CM

## 2023-05-30 DIAGNOSIS — J20.9 ACUTE BRONCHITIS WITH WHEEZING: ICD-10-CM

## 2023-05-30 DIAGNOSIS — J30.1 SEASONAL ALLERGIC RHINITIS DUE TO POLLEN: ICD-10-CM

## 2023-05-30 DIAGNOSIS — Z23 NEED FOR VACCINATION: ICD-10-CM

## 2023-05-30 DIAGNOSIS — J45.909 REACTIVE AIRWAY DISEASE WITHOUT COMPLICATION, UNSPECIFIED ASTHMA SEVERITY, UNSPECIFIED WHETHER PERSISTENT: ICD-10-CM

## 2023-05-30 PROCEDURE — 3074F SYST BP LT 130 MM HG: CPT | Performed by: INTERNAL MEDICINE

## 2023-05-30 PROCEDURE — 99214 OFFICE O/P EST MOD 30 MIN: CPT | Performed by: INTERNAL MEDICINE

## 2023-05-30 PROCEDURE — 3078F DIAST BP <80 MM HG: CPT | Performed by: INTERNAL MEDICINE

## 2023-05-30 RX ORDER — ALBUTEROL SULFATE 90 UG/1
2 AEROSOL, METERED RESPIRATORY (INHALATION) EVERY 6 HOURS PRN
Qty: 8.5 G | Refills: 5 | Status: SHIPPED | OUTPATIENT
Start: 2023-05-30 | End: 2023-10-11

## 2023-05-30 RX ORDER — FLUTICASONE PROPIONATE 50 MCG
SPRAY, SUSPENSION (ML) NASAL
Qty: 96 ML | Refills: 16 | Status: SHIPPED | OUTPATIENT
Start: 2023-05-30 | End: 2023-12-05 | Stop reason: SDUPTHER

## 2023-05-30 ASSESSMENT — ENCOUNTER SYMPTOMS
CONSTITUTIONAL NEGATIVE: 1
CARDIOVASCULAR NEGATIVE: 1
GASTROINTESTINAL NEGATIVE: 1
RESPIRATORY NEGATIVE: 1
PSYCHIATRIC NEGATIVE: 1
NEUROLOGICAL NEGATIVE: 1
MUSCULOSKELETAL NEGATIVE: 1
EYES NEGATIVE: 1

## 2023-05-30 ASSESSMENT — PATIENT HEALTH QUESTIONNAIRE - PHQ9: CLINICAL INTERPRETATION OF PHQ2 SCORE: 0

## 2023-05-30 NOTE — PROGRESS NOTES
Subjective     Adán Strauss is a 62 y.o. female who presents with Follow-Up (Lab review )  The patient is here for followup of chronic medical problems listed below. The patient is compliant with medications and having no side effects from them. Denies chest pain, abdominal pain, dyspnea, myalgias, or cough.   Patient Active Problem List   Diagnosis    Celiac disease    Mixed hyperlipidemia- mild no meds; hdl> 60    Vaginal atrophy    Gastroesophageal reflux disease with esophagitis    Umbilical hernia without obstruction and without gangrene    Seasonal allergic rhinitis due to pollen    Reactive airway disease without complication     Outpatient Medications Prior to Visit   Medication Sig Dispense Refill    albuterol 108 (90 Base) MCG/ACT Aero Soln inhalation aerosol Inhale 2 Puffs every four hours as needed for Shortness of Breath. 1 Each 0    Cholecalciferol (VITAMIN D3 ADULT GUMMIES PO) Take  by mouth.      therapeutic multivitamin-minerals (THERAGRAN-M) Tab Take 1 Tab by mouth every day.      fluticasone (FLONASE) 50 MCG/ACT nasal spray SPRAY 2 SPRAYS INTO AFFECTED NOSTRIL(S) TWICE A DAY 96 mL 16    albuterol 108 (90 Base) MCG/ACT Aero Soln inhalation aerosol Inhale 2 Puffs every 6 hours as needed for Shortness of Breath. 8.5 g 5     No facility-administered medications prior to visit.     Hospital Outpatient Visit on 05/12/2023   Component Date Value    TSH 05/12/2023 1.260     Sodium 05/12/2023 140     Potassium 05/12/2023 4.4     Chloride 05/12/2023 104     Co2 05/12/2023 23     Anion Gap 05/12/2023 13.0     Glucose 05/12/2023 89     Bun 05/12/2023 25 (H)     Creatinine 05/12/2023 0.59     Calcium 05/12/2023 9.6     AST(SGOT) 05/12/2023 22     ALT(SGPT) 05/12/2023 18     Alkaline Phosphatase 05/12/2023 123 (H)     Total Bilirubin 05/12/2023 0.8     Albumin 05/12/2023 4.2     Total Protein 05/12/2023 6.8     Globulin 05/12/2023 2.6     A-G Ratio 05/12/2023 1.6     Cholesterol,Tot 05/12/2023 186      Triglycerides 05/12/2023 84     HDL 05/12/2023 80     LDL 05/12/2023 89     WBC 05/12/2023 6.1     RBC 05/12/2023 4.87     Hemoglobin 05/12/2023 14.7     Hematocrit 05/12/2023 46.0     MCV 05/12/2023 94.5     MCH 05/12/2023 30.2     MCHC 05/12/2023 32.0 (L)     RDW 05/12/2023 45.8     Platelet Count 05/12/2023 317     MPV 05/12/2023 9.7     Neutrophils-Polys 05/12/2023 54.40     Lymphocytes 05/12/2023 30.70     Monocytes 05/12/2023 8.30     Eosinophils 05/12/2023 5.20     Basophils 05/12/2023 1.10     Immature Granulocytes 05/12/2023 0.30     Nucleated RBC 05/12/2023 0.00     Neutrophils (Absolute) 05/12/2023 3.33     Lymphs (Absolute) 05/12/2023 1.88     Monos (Absolute) 05/12/2023 0.51     Eos (Absolute) 05/12/2023 0.32     Baso (Absolute) 05/12/2023 0.07     Immature Granulocytes (a* 05/12/2023 0.02     NRBC (Absolute) 05/12/2023 0.00     25-Hydroxy   Vitamin D 25 05/12/2023 44     Glycohemoglobin 05/12/2023 5.5     Est Avg Glucose 05/12/2023 111     Fasting Status 05/12/2023 Fasting     Correct Calcium 05/12/2023 9.4     GFR (CKD-EPI) 05/12/2023 101       Lab Results   Component Value Date/Time    HBA1C 5.5 05/12/2023 06:10 AM    HBA1C 5.6 11/22/2022 07:02 AM     Lab Results   Component Value Date/Time    SODIUM 140 05/12/2023 06:10 AM    POTASSIUM 4.4 05/12/2023 06:10 AM    CHLORIDE 104 05/12/2023 06:10 AM    CO2 23 05/12/2023 06:10 AM    GLUCOSE 89 05/12/2023 06:10 AM    BUN 25 (H) 05/12/2023 06:10 AM    CREATININE 0.59 05/12/2023 06:10 AM    BUNCREATRAT 16 05/13/2017 07:25 AM    ALKPHOSPHAT 123 (H) 05/12/2023 06:10 AM    ASTSGOT 22 05/12/2023 06:10 AM    ALTSGPT 18 05/12/2023 06:10 AM    TBILIRUBIN 0.8 05/12/2023 06:10 AM     No results found for: INR  Lab Results   Component Value Date/Time    CHOLSTRLTOT 186 05/12/2023 06:10 AM    LDL 89 05/12/2023 06:10 AM    HDL 80 05/12/2023 06:10 AM    TRIGLYCERIDE 84 05/12/2023 06:10 AM       No results found for: TESTOSTERONE  No results found for: TSH  No results  "found for: FREET4  No results found for: URICACID  No components found for: VITB12  Lab Results   Component Value Date/Time    25HYDROXY 44 05/12/2023 06:10 AM    25HYDROXY 43 11/22/2022 07:02 AM     '           HPI    Review of Systems   Constitutional: Negative.    HENT: Negative.     Eyes: Negative.    Respiratory: Negative.     Cardiovascular: Negative.    Gastrointestinal: Negative.    Genitourinary: Negative.    Musculoskeletal: Negative.    Skin: Negative.    Neurological: Negative.    Endo/Heme/Allergies: Negative.    Psychiatric/Behavioral: Negative.                Objective     /70 (BP Location: Right arm, Patient Position: Sitting, BP Cuff Size: Adult)   Pulse 60   Temp 36.8 °C (98.2 °F) (Temporal)   Ht 1.651 m (5' 5\")   LMP  (LMP Unknown)   SpO2 98%   BMI 24.63 kg/m²      Physical Exam  Vitals reviewed.   Constitutional:       General: She is not in acute distress.     Appearance: She is well-developed. She is not diaphoretic.   HENT:      Head: Normocephalic and atraumatic.      Right Ear: External ear normal.      Left Ear: External ear normal.      Nose: Nose normal.      Mouth/Throat:      Pharynx: No oropharyngeal exudate.   Eyes:      General: No scleral icterus.        Right eye: No discharge.         Left eye: No discharge.      Conjunctiva/sclera: Conjunctivae normal.      Pupils: Pupils are equal, round, and reactive to light.   Neck:      Thyroid: No thyromegaly.      Vascular: No JVD.      Trachea: No tracheal deviation.   Cardiovascular:      Rate and Rhythm: Normal rate and regular rhythm.      Heart sounds: Normal heart sounds. No murmur heard.     No friction rub. No gallop.   Pulmonary:      Effort: Pulmonary effort is normal. No respiratory distress.      Breath sounds: Normal breath sounds. No stridor. No wheezing or rales.   Chest:      Chest wall: No tenderness.   Abdominal:      General: Bowel sounds are normal. There is no distension.      Palpations: Abdomen is soft. " There is no mass.      Tenderness: There is no abdominal tenderness. There is no guarding or rebound.   Musculoskeletal:         General: No tenderness. Normal range of motion.      Cervical back: Normal range of motion and neck supple.   Lymphadenopathy:      Cervical: No cervical adenopathy.   Skin:     General: Skin is warm and dry.      Coloration: Skin is not pale.      Findings: No erythema or rash.   Neurological:      Mental Status: She is alert and oriented to person, place, and time.      Cranial Nerves: No cranial nerve deficit.      Motor: No abnormal muscle tone.      Coordination: Coordination normal.      Deep Tendon Reflexes: Reflexes are normal and symmetric. Reflexes normal.   Psychiatric:         Behavior: Behavior normal.         Thought Content: Thought content normal.         Judgment: Judgment normal.                             Assessment & Plan        1. Need for vaccination     - Pneumococcal Conjugate Vaccine 20-Valent (19 yrs+)- will get at pharmacy  - Zoster Vac Recomb Adjuvanted (SHINGRIX) 50 MCG/0.5ML Recon Susp; Inject 0.5 mL into the shoulder, thigh, or buttocks one time for 1 dose.  Dispense: 0.5 mL; Refill: 0will get at pharmacy    2. Reactive airway disease without complication, unspecified asthma severity, unspecified whether persistent       Under good control. Continue same regimen.'  - fluticasone (FLONASE) 50 MCG/ACT nasal spray; SPRAY 2 SPRAYS INTO AFFECTED NOSTRIL(S) TWICE A DAY  Dispense: 96 mL; Refill: 16  - albuterol 108 (90 Base) MCG/ACT Aero Soln inhalation aerosol; Inhale 2 Puffs every 6 hours as needed for Shortness of Breath.  Dispense: 8.5 g; Refill: 5    3. Mixed hyperlipidemia- mild no meds; hdl> 60   Under good control. Continue same regimen.'  - CBC WITH DIFFERENTIAL; Future  - Comp Metabolic Panel; Future  - Lipid Profile; Future  - TSH; Future    4. Seasonal allergic rhinitis due to pollen   Under good control. Continue same regimen.    - fluticasone  (FLONASE) 50 MCG/ACT nasal spray; SPRAY 2 SPRAYS INTO AFFECTED NOSTRIL(S) TWICE A DAY  Dispense: 96 mL; Refill: 16    5. Acute bronchitis with wheezing- resolved     - fluticasone (FLONASE) 50 MCG/ACT nasal spray; SPRAY 2 SPRAYS INTO AFFECTED NOSTRIL(S) TWICE A DAY  Dispense: 96 mL; Refill: 16

## 2023-10-11 ENCOUNTER — OFFICE VISIT (OUTPATIENT)
Dept: URGENT CARE | Facility: CLINIC | Age: 63
End: 2023-10-11
Payer: COMMERCIAL

## 2023-10-11 VITALS
OXYGEN SATURATION: 96 % | SYSTOLIC BLOOD PRESSURE: 122 MMHG | BODY MASS INDEX: 24.43 KG/M2 | RESPIRATION RATE: 18 BRPM | TEMPERATURE: 98 F | HEART RATE: 77 BPM | DIASTOLIC BLOOD PRESSURE: 60 MMHG | WEIGHT: 152 LBS | HEIGHT: 66 IN

## 2023-10-11 DIAGNOSIS — U07.1 COVID-19: ICD-10-CM

## 2023-10-11 DIAGNOSIS — J32.9 RHINOSINUSITIS: ICD-10-CM

## 2023-10-11 DIAGNOSIS — R05.1 ACUTE COUGH: ICD-10-CM

## 2023-10-11 LAB
FLUAV RNA SPEC QL NAA+PROBE: NEGATIVE
FLUBV RNA SPEC QL NAA+PROBE: NEGATIVE
RSV RNA SPEC QL NAA+PROBE: NEGATIVE
SARS-COV-2 RNA RESP QL NAA+PROBE: POSITIVE

## 2023-10-11 PROCEDURE — 3078F DIAST BP <80 MM HG: CPT | Performed by: FAMILY MEDICINE

## 2023-10-11 PROCEDURE — 0241U POCT CEPHEID COV-2, FLU A/B, RSV - PCR: CPT | Performed by: FAMILY MEDICINE

## 2023-10-11 PROCEDURE — 3074F SYST BP LT 130 MM HG: CPT | Performed by: FAMILY MEDICINE

## 2023-10-11 PROCEDURE — 99213 OFFICE O/P EST LOW 20 MIN: CPT | Performed by: FAMILY MEDICINE

## 2023-10-11 RX ORDER — AMOXICILLIN AND CLAVULANATE POTASSIUM 875; 125 MG/1; MG/1
1 TABLET, FILM COATED ORAL 2 TIMES DAILY
Qty: 14 TABLET | Refills: 0 | Status: SHIPPED | OUTPATIENT
Start: 2023-10-11 | End: 2023-10-18

## 2023-10-11 RX ORDER — DEXTROMETHORPHAN HYDROBROMIDE AND PROMETHAZINE HYDROCHLORIDE 15; 6.25 MG/5ML; MG/5ML
5 SYRUP ORAL 4 TIMES DAILY PRN
Qty: 120 ML | Refills: 0 | Status: SHIPPED | OUTPATIENT
Start: 2023-10-11 | End: 2023-12-05

## 2023-10-11 ASSESSMENT — ENCOUNTER SYMPTOMS
NAUSEA: 0
EYE REDNESS: 0
MYALGIAS: 1
WEIGHT LOSS: 0
DIARRHEA: 0
EYE DISCHARGE: 0
VOMITING: 0

## 2023-10-11 ASSESSMENT — FIBROSIS 4 INDEX: FIB4 SCORE: 1.01

## 2023-10-11 NOTE — PROGRESS NOTES
"Julio Strauss is a 62 y.o. female who presents with Fever (Sinus pressure , chest congestion, cough, running nose,  x 2 days)            2 days sinus pressure, drainage. Dry cough. Fever. Tmax 100.8. HA. Myalgia. No known exposures. No other aggravating or alleviating factors.          Review of Systems   Constitutional:  Positive for malaise/fatigue. Negative for weight loss.   Eyes:  Negative for discharge and redness.   Gastrointestinal:  Negative for diarrhea, nausea and vomiting.   Musculoskeletal:  Positive for myalgias. Negative for joint pain.   Skin:  Negative for itching and rash.              Objective     /60   Pulse 77   Temp 36.7 °C (98 °F) (Temporal)   Resp 18   Ht 1.676 m (5' 6\")   Wt 68.9 kg (152 lb)   LMP  (LMP Unknown)   SpO2 96%   BMI 24.53 kg/m²      Physical Exam  Constitutional:       General: She is not in acute distress.     Appearance: She is well-developed.   HENT:      Head: Normocephalic and atraumatic.      Right Ear: Tympanic membrane normal.      Left Ear: Tympanic membrane normal.      Nose: Congestion present.      Mouth/Throat:      Mouth: Mucous membranes are moist.      Pharynx: No posterior oropharyngeal erythema.   Eyes:      Conjunctiva/sclera: Conjunctivae normal.   Cardiovascular:      Rate and Rhythm: Normal rate and regular rhythm.      Heart sounds: Normal heart sounds. No murmur heard.  Pulmonary:      Effort: Pulmonary effort is normal.      Breath sounds: Normal breath sounds. No wheezing.   Skin:     General: Skin is warm and dry.      Findings: No rash.   Neurological:      Mental Status: She is alert.                             Assessment & Plan      Poct pcr c19+  eGFR 5/2023: 101    1. Rhinosinusitis  POCT CEPHEID COV-2, FLU A/B, RSV - PCR    amoxicillin-clavulanate (AUGMENTIN) 875-125 MG Tab      2. Acute cough  promethazine-dextromethorphan (PROMETHAZINE-DM) 6.25-15 MG/5ML syrup      3. COVID-19  Nirmatrelvir&Ritonavir 300/100 20 x " 150 MG & 10 x 100MG Tablet Therapy Pack        Differential diagnosis, natural history, supportive care, and indications for immediate follow-up were discussed.     Nasal saline, decongestant, nasal CS    Contingent antibiotic prescription given to patient to fill upon meeting criteria of guidelines discussed.

## 2023-10-23 ENCOUNTER — HOSPITAL ENCOUNTER (OUTPATIENT)
Dept: LAB | Facility: MEDICAL CENTER | Age: 63
End: 2023-10-23
Attending: INTERNAL MEDICINE
Payer: COMMERCIAL

## 2023-10-23 DIAGNOSIS — E78.2 MIXED HYPERLIPIDEMIA: ICD-10-CM

## 2023-10-23 LAB
ALBUMIN SERPL BCP-MCNC: 4.6 G/DL (ref 3.2–4.9)
ALBUMIN/GLOB SERPL: 2 G/DL
ALP SERPL-CCNC: 109 U/L (ref 30–99)
ALT SERPL-CCNC: 19 U/L (ref 2–50)
ANION GAP SERPL CALC-SCNC: 11 MMOL/L (ref 7–16)
AST SERPL-CCNC: 23 U/L (ref 12–45)
BASOPHILS # BLD AUTO: 1.2 % (ref 0–1.8)
BASOPHILS # BLD: 0.06 K/UL (ref 0–0.12)
BILIRUB SERPL-MCNC: 0.5 MG/DL (ref 0.1–1.5)
BUN SERPL-MCNC: 15 MG/DL (ref 8–22)
CALCIUM ALBUM COR SERPL-MCNC: 9.2 MG/DL (ref 8.5–10.5)
CALCIUM SERPL-MCNC: 9.7 MG/DL (ref 8.5–10.5)
CHLORIDE SERPL-SCNC: 105 MMOL/L (ref 96–112)
CHOLEST SERPL-MCNC: 223 MG/DL (ref 100–199)
CO2 SERPL-SCNC: 26 MMOL/L (ref 20–33)
CREAT SERPL-MCNC: 0.66 MG/DL (ref 0.5–1.4)
EOSINOPHIL # BLD AUTO: 0.24 K/UL (ref 0–0.51)
EOSINOPHIL NFR BLD: 4.7 % (ref 0–6.9)
ERYTHROCYTE [DISTWIDTH] IN BLOOD BY AUTOMATED COUNT: 44.6 FL (ref 35.9–50)
FASTING STATUS PATIENT QL REPORTED: NORMAL
GFR SERPLBLD CREATININE-BSD FMLA CKD-EPI: 98 ML/MIN/1.73 M 2
GLOBULIN SER CALC-MCNC: 2.3 G/DL (ref 1.9–3.5)
GLUCOSE SERPL-MCNC: 86 MG/DL (ref 65–99)
HCT VFR BLD AUTO: 45.4 % (ref 37–47)
HDLC SERPL-MCNC: 64 MG/DL
HGB BLD-MCNC: 14.6 G/DL (ref 12–16)
IMM GRANULOCYTES # BLD AUTO: 0.01 K/UL (ref 0–0.11)
IMM GRANULOCYTES NFR BLD AUTO: 0.2 % (ref 0–0.9)
LDLC SERPL CALC-MCNC: 127 MG/DL
LYMPHOCYTES # BLD AUTO: 1.57 K/UL (ref 1–4.8)
LYMPHOCYTES NFR BLD: 30.9 % (ref 22–41)
MCH RBC QN AUTO: 30.2 PG (ref 27–33)
MCHC RBC AUTO-ENTMCNC: 32.2 G/DL (ref 32.2–35.5)
MCV RBC AUTO: 93.8 FL (ref 81.4–97.8)
MONOCYTES # BLD AUTO: 0.48 K/UL (ref 0–0.85)
MONOCYTES NFR BLD AUTO: 9.4 % (ref 0–13.4)
NEUTROPHILS # BLD AUTO: 2.72 K/UL (ref 1.82–7.42)
NEUTROPHILS NFR BLD: 53.6 % (ref 44–72)
NRBC # BLD AUTO: 0 K/UL
NRBC BLD-RTO: 0 /100 WBC (ref 0–0.2)
PLATELET # BLD AUTO: 349 K/UL (ref 164–446)
PMV BLD AUTO: 9.2 FL (ref 9–12.9)
POTASSIUM SERPL-SCNC: 4.4 MMOL/L (ref 3.6–5.5)
PROT SERPL-MCNC: 6.9 G/DL (ref 6–8.2)
RBC # BLD AUTO: 4.84 M/UL (ref 4.2–5.4)
SODIUM SERPL-SCNC: 142 MMOL/L (ref 135–145)
TRIGL SERPL-MCNC: 162 MG/DL (ref 0–149)
TSH SERPL DL<=0.005 MIU/L-ACNC: 1.06 UIU/ML (ref 0.38–5.33)
WBC # BLD AUTO: 5.1 K/UL (ref 4.8–10.8)

## 2023-10-23 PROCEDURE — 36415 COLL VENOUS BLD VENIPUNCTURE: CPT

## 2023-10-23 PROCEDURE — 85025 COMPLETE CBC W/AUTO DIFF WBC: CPT

## 2023-10-23 PROCEDURE — 80053 COMPREHEN METABOLIC PANEL: CPT

## 2023-10-23 PROCEDURE — 80061 LIPID PANEL: CPT

## 2023-10-23 PROCEDURE — 84443 ASSAY THYROID STIM HORMONE: CPT

## 2023-12-05 ENCOUNTER — OFFICE VISIT (OUTPATIENT)
Dept: MEDICAL GROUP | Age: 63
End: 2023-12-05
Payer: COMMERCIAL

## 2023-12-05 VITALS
BODY MASS INDEX: 25.16 KG/M2 | SYSTOLIC BLOOD PRESSURE: 122 MMHG | WEIGHT: 151 LBS | OXYGEN SATURATION: 97 % | TEMPERATURE: 97.9 F | HEIGHT: 65 IN | HEART RATE: 58 BPM | DIASTOLIC BLOOD PRESSURE: 64 MMHG

## 2023-12-05 DIAGNOSIS — J45.909 REACTIVE AIRWAY DISEASE WITHOUT COMPLICATION, UNSPECIFIED ASTHMA SEVERITY, UNSPECIFIED WHETHER PERSISTENT: ICD-10-CM

## 2023-12-05 DIAGNOSIS — M62.838 TRAPEZIUS MUSCLE SPASM: ICD-10-CM

## 2023-12-05 DIAGNOSIS — R07.9 CHEST PAIN AT REST: ICD-10-CM

## 2023-12-05 DIAGNOSIS — R13.19 ESOPHAGEAL DYSPHAGIA: ICD-10-CM

## 2023-12-05 DIAGNOSIS — E78.2 MIXED HYPERLIPIDEMIA: ICD-10-CM

## 2023-12-05 DIAGNOSIS — J30.1 SEASONAL ALLERGIC RHINITIS DUE TO POLLEN: ICD-10-CM

## 2023-12-05 PROCEDURE — 93000 ELECTROCARDIOGRAM COMPLETE: CPT | Performed by: INTERNAL MEDICINE

## 2023-12-05 PROCEDURE — 99214 OFFICE O/P EST MOD 30 MIN: CPT | Performed by: INTERNAL MEDICINE

## 2023-12-05 PROCEDURE — 3074F SYST BP LT 130 MM HG: CPT | Performed by: INTERNAL MEDICINE

## 2023-12-05 PROCEDURE — 3078F DIAST BP <80 MM HG: CPT | Performed by: INTERNAL MEDICINE

## 2023-12-05 RX ORDER — FLUTICASONE PROPIONATE 50 MCG
SPRAY, SUSPENSION (ML) NASAL
Qty: 96 ML | Refills: 16 | Status: SHIPPED
Start: 2023-12-05

## 2023-12-05 RX ORDER — ALBUTEROL SULFATE 90 UG/1
2 AEROSOL, METERED RESPIRATORY (INHALATION) EVERY 4 HOURS PRN
Qty: 1 EACH | Refills: 0 | Status: SHIPPED | OUTPATIENT
Start: 2023-12-05 | End: 2023-12-05 | Stop reason: SDUPTHER

## 2023-12-05 RX ORDER — OMEPRAZOLE 40 MG/1
40 CAPSULE, DELAYED RELEASE ORAL DAILY
Qty: 90 CAPSULE | Refills: 1 | Status: SHIPPED | OUTPATIENT
Start: 2023-12-05 | End: 2023-12-19 | Stop reason: SDUPTHER

## 2023-12-05 RX ORDER — ALBUTEROL SULFATE 90 UG/1
2 AEROSOL, METERED RESPIRATORY (INHALATION) EVERY 4 HOURS PRN
Qty: 1 EACH | Refills: 0 | Status: SHIPPED
Start: 2023-12-05

## 2023-12-05 RX ORDER — TIZANIDINE 4 MG/1
4 TABLET ORAL 2 TIMES DAILY PRN
Qty: 60 TABLET | Refills: 2 | Status: SHIPPED | OUTPATIENT
Start: 2023-12-05 | End: 2023-12-27

## 2023-12-05 RX ORDER — FLUTICASONE PROPIONATE 50 MCG
SPRAY, SUSPENSION (ML) NASAL
Qty: 96 ML | Refills: 16 | Status: SHIPPED | OUTPATIENT
Start: 2023-12-05 | End: 2023-12-05 | Stop reason: SDUPTHER

## 2023-12-05 ASSESSMENT — ENCOUNTER SYMPTOMS
EYES NEGATIVE: 1
GASTROINTESTINAL NEGATIVE: 1
RESPIRATORY NEGATIVE: 1
MYALGIAS: 1
CONSTITUTIONAL NEGATIVE: 1
PSYCHIATRIC NEGATIVE: 1
BACK PAIN: 1
NEUROLOGICAL NEGATIVE: 1

## 2023-12-05 ASSESSMENT — FIBROSIS 4 INDEX: FIB4 SCORE: 0.95

## 2023-12-05 NOTE — PROGRESS NOTES
Subjective     Adán Strauss is a 63 y.o. female who presents with Follow-Up (A week ago she had chest pain that went into the middle of her back by her shoulder blade )  Patient was here as scheduled for follow-up of her ongoing medical problems of reactive airway disease and allergic rhinitis and borderline dyslipidemia celiac disease and vaginal atrophy all of which have been stable on current medical regimen as listed below.    However the patient was concerned about an episode she had a few weeks ago of chest pain lasting several hours at rest it was sharp and stabbing and radiated to the jaw but associated with no shortness of breath or diaphoresis and unrelated to exertion or food.  The pain subsequently radiated to the back of her chest upper back area and was worse with changes in position.  The chest pain eventually resolved on its own after drinking lots of water.  She also complained of food getting stuck in the lower esophageal area frequently and has had a past history of erosive esophagitis treated with OTC medications.    Patient denies any history of heart disease or exertional chest pain.  She denies using his any NSAIDs for the pain.    Her medications have been albuterol inhaler as needed and vitamin D 2000 units daily and Flonase 2 puffs on each side once a day.  Patient was successfully treated with Paxlovid and overcame COVID about 6 weeks ago.    At this time she is not having any chest pain but still continues to have the upper mid back pain.            HPI    Review of Systems   Constitutional: Negative.    HENT: Negative.     Eyes: Negative.    Respiratory: Negative.     Cardiovascular:  Positive for chest pain.   Gastrointestinal: Negative.    Genitourinary: Negative.    Musculoskeletal:  Positive for back pain and myalgias.   Skin: Negative.    Neurological: Negative.    Endo/Heme/Allergies: Negative.    Psychiatric/Behavioral: Negative.                Objective     /64 (BP  "Location: Right arm, Patient Position: Sitting, BP Cuff Size: Adult)   Pulse (!) 58   Temp 36.6 °C (97.9 °F) (Temporal)   Ht 1.651 m (5' 5\")   Wt 68.5 kg (151 lb)   LMP  (LMP Unknown)   SpO2 97%   BMI 25.13 kg/m²      Physical Exam  Vitals reviewed.   Constitutional:       General: She is not in acute distress.     Appearance: She is well-developed. She is not diaphoretic.   HENT:      Head: Normocephalic and atraumatic.      Right Ear: External ear normal.      Left Ear: External ear normal.      Nose: Nose normal.      Mouth/Throat:      Pharynx: No oropharyngeal exudate.   Eyes:      General: No scleral icterus.        Right eye: No discharge.         Left eye: No discharge.      Conjunctiva/sclera: Conjunctivae normal.      Pupils: Pupils are equal, round, and reactive to light.   Neck:      Thyroid: No thyromegaly.      Vascular: No JVD.      Trachea: No tracheal deviation.   Cardiovascular:      Rate and Rhythm: Normal rate and regular rhythm.      Heart sounds: Normal heart sounds. No murmur heard.     No friction rub. No gallop.      Comments: Patient has some slight parasternal tenderness to deep palpation on the left side.  Pulmonary:      Effort: Pulmonary effort is normal. No respiratory distress.      Breath sounds: Normal breath sounds. No stridor. No wheezing or rales.   Chest:      Chest wall: Tenderness present.   Abdominal:      General: Bowel sounds are normal. There is no distension.      Palpations: Abdomen is soft. There is no mass.      Tenderness: There is no abdominal tenderness. There is no guarding or rebound.   Musculoskeletal:         General: Tenderness present. Normal range of motion.      Cervical back: Normal range of motion and neck supple.      Comments: There is mild mid left trapezius muscles spasm and tenderness palpation of which reproduces her pain.   Lymphadenopathy:      Cervical: No cervical adenopathy.   Skin:     General: Skin is warm and dry.      Coloration: Skin " is not pale.      Findings: No erythema or rash.   Neurological:      Mental Status: She is alert and oriented to person, place, and time.      Cranial Nerves: No cranial nerve deficit.      Motor: No abnormal muscle tone.      Coordination: Coordination normal.      Deep Tendon Reflexes: Reflexes are normal and symmetric. Reflexes normal.   Psychiatric:         Behavior: Behavior normal.         Thought Content: Thought content normal.         Judgment: Judgment normal.       EKG reviewed by me shows sinus bradycardia 55 with normal sinus rhythm.  No ST segment changes of ischemia.    No visits with results within 1 Month(s) from this visit.   Latest known visit with results is:   Hospital Outpatient Visit on 10/23/2023   Component Date Value    WBC 10/23/2023 5.1     RBC 10/23/2023 4.84     Hemoglobin 10/23/2023 14.6     Hematocrit 10/23/2023 45.4     MCV 10/23/2023 93.8     MCH 10/23/2023 30.2     MCHC 10/23/2023 32.2     RDW 10/23/2023 44.6     Platelet Count 10/23/2023 349     MPV 10/23/2023 9.2     Neutrophils-Polys 10/23/2023 53.60     Lymphocytes 10/23/2023 30.90     Monocytes 10/23/2023 9.40     Eosinophils 10/23/2023 4.70     Basophils 10/23/2023 1.20     Immature Granulocytes 10/23/2023 0.20     Nucleated RBC 10/23/2023 0.00     Neutrophils (Absolute) 10/23/2023 2.72     Lymphs (Absolute) 10/23/2023 1.57     Monos (Absolute) 10/23/2023 0.48     Eos (Absolute) 10/23/2023 0.24     Baso (Absolute) 10/23/2023 0.06     Immature Granulocytes (a* 10/23/2023 0.01     NRBC (Absolute) 10/23/2023 0.00     TSH 10/23/2023 1.060     Cholesterol,Tot 10/23/2023 223 (H)     Triglycerides 10/23/2023 162 (H)     HDL 10/23/2023 64     LDL 10/23/2023 127 (H)     Sodium 10/23/2023 142     Potassium 10/23/2023 4.4     Chloride 10/23/2023 105     Co2 10/23/2023 26     Anion Gap 10/23/2023 11.0     Glucose 10/23/2023 86     Bun 10/23/2023 15     Creatinine 10/23/2023 0.66     Calcium 10/23/2023 9.7     Correct Calcium 10/23/2023  "9.2     AST(SGOT) 10/23/2023 23     ALT(SGPT) 10/23/2023 19     Alkaline Phosphatase 10/23/2023 109 (H)     Total Bilirubin 10/23/2023 0.5     Albumin 10/23/2023 4.6     Total Protein 10/23/2023 6.9     Globulin 10/23/2023 2.3     A-G Ratio 10/23/2023 2.0     Fasting Status 10/23/2023 Fasting     GFR (CKD-EPI) 10/23/2023 98       Lab Results   Component Value Date/Time    HBA1C 5.5 05/12/2023 06:10 AM    HBA1C 5.6 11/22/2022 07:02 AM     Lab Results   Component Value Date/Time    SODIUM 142 10/23/2023 06:06 AM    POTASSIUM 4.4 10/23/2023 06:06 AM    CHLORIDE 105 10/23/2023 06:06 AM    CO2 26 10/23/2023 06:06 AM    GLUCOSE 86 10/23/2023 06:06 AM    BUN 15 10/23/2023 06:06 AM    CREATININE 0.66 10/23/2023 06:06 AM    BUNCREATRAT 16 05/13/2017 07:25 AM    ALKPHOSPHAT 109 (H) 10/23/2023 06:06 AM    ASTSGOT 23 10/23/2023 06:06 AM    ALTSGPT 19 10/23/2023 06:06 AM    TBILIRUBIN 0.5 10/23/2023 06:06 AM     No results found for: \"INR\"  Lab Results   Component Value Date/Time    CHOLSTRLTOT 223 (H) 10/23/2023 06:06 AM     (H) 10/23/2023 06:06 AM    HDL 64 10/23/2023 06:06 AM    TRIGLYCERIDE 162 (H) 10/23/2023 06:06 AM       No results found for: \"TESTOSTERONE\"  No results found for: \"TSH\"  No results found for: \"FREET4\"  No results found for: \"URICACID\"  No components found for: \"VITB12\"  Lab Results   Component Value Date/Time    25HYDROXY 44 05/12/2023 06:10 AM    25HYDROXY 43 11/22/2022 07:02 AM     '                      Assessment & Plan        1. Chest pain at rest-unclear etiology but most likely secondary to lower esophageal spasm from her known history of erosive gastritis and lower esophageal dysphagia which still plagued her at this time.    However with the radiation of the pain to the jaw but still be concerned about coronary disease even though there is no exertional component.  She does have mild dyslipidemia although her Binger risk score is only 4%.    Therefore we will proceed with cardiac echo " exercise stress testing.  She is allergic to intravenous dyes.    Chest x-ray ordered to rule out endometrial thoracic pathology      - EKG  - EC-ECHOCARDIOGRAM DOBUTAMINE REST/STRESS W/O CONT; Future  - DX-CHEST-2 VIEWS; Future    2. Trapezius muscle spasm     - tizanidine (ZANAFLEX) 4 MG Tab; Take 1 Tablet by mouth 2 times a day as needed (back spasms and /or pain).  Dispense: 60 Tablet; Refill: 2    3. Esophageal dysphagia  Will need barium swallow to assess for possible stricture of the lower esophageal area started on Prilosec and reviewed check her in 2 weeks to see how she responded to this.  - DX-ESOPHAGUS BARIUM SWALLOW SINGLE CONTRAST; Future  - omeprazole (PRILOSEC) 40 MG delayed-release capsule; Take 1 Capsule by mouth every day.  Dispense: 90 Capsule; Refill: 1    4. Mixed hyperlipidemia- mild no meds; hdl> 60  Patient will continue with Mediterranean diet and exercise.  Consider red yeast rice OTC.         6. Reactive airway disease without complication, unspecified asthma severity, unspecified whether persistent    albuterol 108 (90 Base) MCG/ACT Aero Soln inhalation aerosol; Inhale 2 Puffs every four hours as needed for Shortness of Breath.  Dispense: 1 Each; Refill: 0         7. Seasonal allergic rhinitis due to pollen  Good control with Flonase.  - fluticasone (FLONASE) 50 MCG/ACT nasal spray; SPRAY 2 SPRAYS INTO AFFECTED NOSTRIL(S) TWICE A DAY  Dispense: 96 mL; Refill: 16     -

## 2023-12-08 ENCOUNTER — HOSPITAL ENCOUNTER (OUTPATIENT)
Dept: RADIOLOGY | Facility: MEDICAL CENTER | Age: 63
End: 2023-12-08
Attending: INTERNAL MEDICINE
Payer: COMMERCIAL

## 2023-12-08 DIAGNOSIS — R07.9 CHEST PAIN AT REST: ICD-10-CM

## 2023-12-08 PROCEDURE — 71046 X-RAY EXAM CHEST 2 VIEWS: CPT

## 2023-12-13 ENCOUNTER — APPOINTMENT (OUTPATIENT)
Dept: CARDIOLOGY | Facility: MEDICAL CENTER | Age: 63
End: 2023-12-13
Attending: INTERNAL MEDICINE
Payer: COMMERCIAL

## 2023-12-19 ENCOUNTER — OFFICE VISIT (OUTPATIENT)
Dept: MEDICAL GROUP | Age: 63
End: 2023-12-19
Payer: COMMERCIAL

## 2023-12-19 VITALS
HEIGHT: 65 IN | OXYGEN SATURATION: 95 % | SYSTOLIC BLOOD PRESSURE: 120 MMHG | BODY MASS INDEX: 25.49 KG/M2 | HEART RATE: 64 BPM | WEIGHT: 153 LBS | DIASTOLIC BLOOD PRESSURE: 70 MMHG | TEMPERATURE: 97.4 F

## 2023-12-19 DIAGNOSIS — J45.20 MILD INTERMITTENT REACTIVE AIRWAY DISEASE WITHOUT COMPLICATION: ICD-10-CM

## 2023-12-19 DIAGNOSIS — R13.19 ESOPHAGEAL DYSPHAGIA: ICD-10-CM

## 2023-12-19 DIAGNOSIS — R07.9 CHEST PAIN AT REST: ICD-10-CM

## 2023-12-19 PROCEDURE — 3078F DIAST BP <80 MM HG: CPT | Performed by: INTERNAL MEDICINE

## 2023-12-19 PROCEDURE — 3074F SYST BP LT 130 MM HG: CPT | Performed by: INTERNAL MEDICINE

## 2023-12-19 PROCEDURE — 99214 OFFICE O/P EST MOD 30 MIN: CPT | Performed by: INTERNAL MEDICINE

## 2023-12-19 RX ORDER — OMEPRAZOLE 40 MG/1
40 CAPSULE, DELAYED RELEASE ORAL DAILY
Qty: 90 CAPSULE | Refills: 3 | Status: SHIPPED | OUTPATIENT
Start: 2023-12-19

## 2023-12-19 ASSESSMENT — FIBROSIS 4 INDEX: FIB4 SCORE: 0.95

## 2023-12-22 ENCOUNTER — HOSPITAL ENCOUNTER (OUTPATIENT)
Dept: RADIOLOGY | Facility: MEDICAL CENTER | Age: 63
End: 2023-12-22
Attending: INTERNAL MEDICINE
Payer: COMMERCIAL

## 2023-12-22 DIAGNOSIS — R13.19 ESOPHAGEAL DYSPHAGIA: ICD-10-CM

## 2023-12-22 DIAGNOSIS — J98.59 MEDIASTINAL MASS: ICD-10-CM

## 2023-12-22 PROCEDURE — 74220 X-RAY XM ESOPHAGUS 1CNTRST: CPT

## 2023-12-22 ASSESSMENT — ENCOUNTER SYMPTOMS
GASTROINTESTINAL NEGATIVE: 1
RESPIRATORY NEGATIVE: 1
EYES NEGATIVE: 1
NEUROLOGICAL NEGATIVE: 1
MUSCULOSKELETAL NEGATIVE: 1
CARDIOVASCULAR NEGATIVE: 1
PSYCHIATRIC NEGATIVE: 1
CONSTITUTIONAL NEGATIVE: 1

## 2023-12-22 NOTE — PROGRESS NOTES
Subjective     Ti Jessika Strauss is a 63 y.o. female who presents with Follow-Up (Chest xray from last visit  tizanidine gave her nightmares and made her restless )  Patient returns for a 1 to 2-week follow-up visit recent office visit where she complained of nonexertional chest pain at rest and back pain rating to her jaw.  Chest x-ray normal, EKG negative.  Scheduled for stress echo and barium swallow.  Patient complained of lower esophageal dysphagia over the years and worse more recently.  Continues to have no chest pain at this time and the back pain is resolved.  She was unable to tolerate tizanidine prescribed for her back muscle spasms and pain.    Patient Active Problem List   Diagnosis    Celiac disease    Mixed hyperlipidemia- mild no meds; hdl> 60    Vaginal atrophy    Gastroesophageal reflux disease with esophagitis    Umbilical hernia without obstruction and without gangrene    Seasonal allergic rhinitis due to pollen    Reactive airway disease without complication     Outpatient Medications Prior to Visit   Medication Sig Dispense Refill    tizanidine (ZANAFLEX) 4 MG Tab Take 1 Tablet by mouth 2 times a day as needed (back spasms and /or pain). 60 Tablet 2    fluticasone (FLONASE) 50 MCG/ACT nasal spray SPRAY 2 SPRAYS INTO AFFECTED NOSTRIL(S) TWICE A DAY 96 mL 16    albuterol 108 (90 Base) MCG/ACT Aero Soln inhalation aerosol Inhale 2 Puffs every four hours as needed for Shortness of Breath. 1 Each 0    Cholecalciferol (VITAMIN D3 ADULT GUMMIES PO) Take  by mouth.      therapeutic multivitamin-minerals (THERAGRAN-M) Tab Take 1 Tab by mouth every day.      omeprazole (PRILOSEC) 40 MG delayed-release capsule Take 1 Capsule by mouth every day. 90 Capsule 1     No facility-administered medications prior to visit.                     HPI    Review of Systems   Constitutional: Negative.    HENT: Negative.     Eyes: Negative.    Respiratory: Negative.     Cardiovascular: Negative.    Gastrointestinal: Negative.   "  Genitourinary: Negative.    Musculoskeletal: Negative.    Skin: Negative.    Neurological: Negative.    Endo/Heme/Allergies: Negative.    Psychiatric/Behavioral: Negative.                Objective     /70 (BP Location: Right arm, Patient Position: Sitting, BP Cuff Size: Adult)   Pulse 64   Temp 36.3 °C (97.4 °F) (Temporal)   Ht 1.651 m (5' 5\")   Wt 69.4 kg (153 lb)   LMP  (LMP Unknown)   SpO2 95%   BMI 25.46 kg/m²      Physical Exam  Vitals reviewed.   Constitutional:       General: She is not in acute distress.     Appearance: She is well-developed. She is not diaphoretic.   HENT:      Head: Normocephalic and atraumatic.      Right Ear: External ear normal.      Left Ear: External ear normal.      Nose: Nose normal.      Mouth/Throat:      Pharynx: No oropharyngeal exudate.   Eyes:      General: No scleral icterus.        Right eye: No discharge.         Left eye: No discharge.      Conjunctiva/sclera: Conjunctivae normal.      Pupils: Pupils are equal, round, and reactive to light.   Neck:      Thyroid: No thyromegaly.      Vascular: No JVD.      Trachea: No tracheal deviation.   Cardiovascular:      Rate and Rhythm: Normal rate and regular rhythm.      Heart sounds: Normal heart sounds. No murmur heard.     No friction rub. No gallop.   Pulmonary:      Effort: Pulmonary effort is normal. No respiratory distress.      Breath sounds: Normal breath sounds. No stridor. No wheezing or rales.   Chest:      Chest wall: No tenderness.   Abdominal:      General: Bowel sounds are normal. There is no distension.      Palpations: Abdomen is soft. There is no mass.      Tenderness: There is no abdominal tenderness. There is no guarding or rebound.   Musculoskeletal:         General: No tenderness. Normal range of motion.      Cervical back: Normal range of motion and neck supple.   Lymphadenopathy:      Cervical: No cervical adenopathy.   Skin:     General: Skin is warm and dry.      Coloration: Skin is not " pale.      Findings: No erythema or rash.   Neurological:      Mental Status: She is alert and oriented to person, place, and time.      Cranial Nerves: No cranial nerve deficit.      Motor: No abnormal muscle tone.      Coordination: Coordination normal.      Deep Tendon Reflexes: Reflexes are normal and symmetric. Reflexes normal.   Psychiatric:         Behavior: Behavior normal.         Thought Content: Thought content normal.         Judgment: Judgment normal.       No visits with results within 1 Month(s) from this visit.   Latest known visit with results is:   Hospital Outpatient Visit on 10/23/2023   Component Date Value    WBC 10/23/2023 5.1     RBC 10/23/2023 4.84     Hemoglobin 10/23/2023 14.6     Hematocrit 10/23/2023 45.4     MCV 10/23/2023 93.8     MCH 10/23/2023 30.2     MCHC 10/23/2023 32.2     RDW 10/23/2023 44.6     Platelet Count 10/23/2023 349     MPV 10/23/2023 9.2     Neutrophils-Polys 10/23/2023 53.60     Lymphocytes 10/23/2023 30.90     Monocytes 10/23/2023 9.40     Eosinophils 10/23/2023 4.70     Basophils 10/23/2023 1.20     Immature Granulocytes 10/23/2023 0.20     Nucleated RBC 10/23/2023 0.00     Neutrophils (Absolute) 10/23/2023 2.72     Lymphs (Absolute) 10/23/2023 1.57     Monos (Absolute) 10/23/2023 0.48     Eos (Absolute) 10/23/2023 0.24     Baso (Absolute) 10/23/2023 0.06     Immature Granulocytes (a* 10/23/2023 0.01     NRBC (Absolute) 10/23/2023 0.00     TSH 10/23/2023 1.060     Cholesterol,Tot 10/23/2023 223 (H)     Triglycerides 10/23/2023 162 (H)     HDL 10/23/2023 64     LDL 10/23/2023 127 (H)     Sodium 10/23/2023 142     Potassium 10/23/2023 4.4     Chloride 10/23/2023 105     Co2 10/23/2023 26     Anion Gap 10/23/2023 11.0     Glucose 10/23/2023 86     Bun 10/23/2023 15     Creatinine 10/23/2023 0.66     Calcium 10/23/2023 9.7     Correct Calcium 10/23/2023 9.2     AST(SGOT) 10/23/2023 23     ALT(SGPT) 10/23/2023 19     Alkaline Phosphatase 10/23/2023 109 (H)     Total  "Bilirubin 10/23/2023 0.5     Albumin 10/23/2023 4.6     Total Protein 10/23/2023 6.9     Globulin 10/23/2023 2.3     A-G Ratio 10/23/2023 2.0     Fasting Status 10/23/2023 Fasting     GFR (CKD-EPI) 10/23/2023 98     '.alll  Lab Results   Component Value Date/Time    HBA1C 5.5 05/12/2023 06:10 AM    HBA1C 5.6 11/22/2022 07:02 AM     Lab Results   Component Value Date/Time    SODIUM 142 10/23/2023 06:06 AM    POTASSIUM 4.4 10/23/2023 06:06 AM    CHLORIDE 105 10/23/2023 06:06 AM    CO2 26 10/23/2023 06:06 AM    GLUCOSE 86 10/23/2023 06:06 AM    BUN 15 10/23/2023 06:06 AM    CREATININE 0.66 10/23/2023 06:06 AM    BUNCREATRAT 16 05/13/2017 07:25 AM    ALKPHOSPHAT 109 (H) 10/23/2023 06:06 AM    ASTSGOT 23 10/23/2023 06:06 AM    ALTSGPT 19 10/23/2023 06:06 AM    TBILIRUBIN 0.5 10/23/2023 06:06 AM     No results found for: \"INR\"  Lab Results   Component Value Date/Time    CHOLSTRLTOT 223 (H) 10/23/2023 06:06 AM     (H) 10/23/2023 06:06 AM    HDL 64 10/23/2023 06:06 AM    TRIGLYCERIDE 162 (H) 10/23/2023 06:06 AM       No results found for: \"TESTOSTERONE\"  No results found for: \"TSH\"  No results found for: \"FREET4\"  No results found for: \"URICACID\"  No components found for: \"VITB12\"  Lab Results   Component Value Date/Time    25HYDROXY 44 05/12/2023 06:10 AM    25HYDROXY 43 11/22/2022 07:02 AM   '  Lab Results   Component Value Date/Time    HBA1C 5.5 05/12/2023 06:10 AM    HBA1C 5.6 11/22/2022 07:02 AM     Lab Results   Component Value Date/Time    SODIUM 142 10/23/2023 06:06 AM    POTASSIUM 4.4 10/23/2023 06:06 AM    CHLORIDE 105 10/23/2023 06:06 AM    CO2 26 10/23/2023 06:06 AM    GLUCOSE 86 10/23/2023 06:06 AM    BUN 15 10/23/2023 06:06 AM    CREATININE 0.66 10/23/2023 06:06 AM    BUNCREATRAT 16 05/13/2017 07:25 AM    ALKPHOSPHAT 109 (H) 10/23/2023 06:06 AM    ASTSGOT 23 10/23/2023 06:06 AM    ALTSGPT 19 10/23/2023 06:06 AM    TBILIRUBIN 0.5 10/23/2023 06:06 AM     No results found for: \"INR\"  Lab Results " "  Component Value Date/Time    CHOLSTRLTOT 223 (H) 10/23/2023 06:06 AM     (H) 10/23/2023 06:06 AM    HDL 64 10/23/2023 06:06 AM    TRIGLYCERIDE 162 (H) 10/23/2023 06:06 AM       No results found for: \"TESTOSTERONE\"  No results found for: \"TSH\"  No results found for: \"FREET4\"  No results found for: \"URICACID\"  No components found for: \"VITB12\"  Lab Results   Component Value Date/Time    25HYDROXY 44 05/12/2023 06:10 AM    25HYDROXY 43 11/22/2022 07:02 AM     No visits with results within 1 Month(s) from this visit.   Latest known visit with results is:   Hospital Outpatient Visit on 10/23/2023   Component Date Value    WBC 10/23/2023 5.1     RBC 10/23/2023 4.84     Hemoglobin 10/23/2023 14.6     Hematocrit 10/23/2023 45.4     MCV 10/23/2023 93.8     MCH 10/23/2023 30.2     MCHC 10/23/2023 32.2     RDW 10/23/2023 44.6     Platelet Count 10/23/2023 349     MPV 10/23/2023 9.2     Neutrophils-Polys 10/23/2023 53.60     Lymphocytes 10/23/2023 30.90     Monocytes 10/23/2023 9.40     Eosinophils 10/23/2023 4.70     Basophils 10/23/2023 1.20     Immature Granulocytes 10/23/2023 0.20     Nucleated RBC 10/23/2023 0.00     Neutrophils (Absolute) 10/23/2023 2.72     Lymphs (Absolute) 10/23/2023 1.57     Monos (Absolute) 10/23/2023 0.48     Eos (Absolute) 10/23/2023 0.24     Baso (Absolute) 10/23/2023 0.06     Immature Granulocytes (a* 10/23/2023 0.01     NRBC (Absolute) 10/23/2023 0.00     TSH 10/23/2023 1.060     Cholesterol,Tot 10/23/2023 223 (H)     Triglycerides 10/23/2023 162 (H)     HDL 10/23/2023 64     LDL 10/23/2023 127 (H)     Sodium 10/23/2023 142     Potassium 10/23/2023 4.4     Chloride 10/23/2023 105     Co2 10/23/2023 26     Anion Gap 10/23/2023 11.0     Glucose 10/23/2023 86     Bun 10/23/2023 15     Creatinine 10/23/2023 0.66     Calcium 10/23/2023 9.7     Correct Calcium 10/23/2023 9.2     AST(SGOT) 10/23/2023 23     ALT(SGPT) 10/23/2023 19     Alkaline Phosphatase 10/23/2023 109 (H)     Total Bilirubin " 10/23/2023 0.5     Albumin 10/23/2023 4.6     Total Protein 10/23/2023 6.9     Globulin 10/23/2023 2.3     A-G Ratio 10/23/2023 2.0     Fasting Status 10/23/2023 Fasting     GFR (CKD-EPI) 10/23/2023 98                                Assessment & Plan        1. Esophageal dysphagia  Probably needs upper endoscopy to rule out esophageal stricture spasm or CA.  Referral placed.  Continue with barium swallow for now and PPI therapy.  Recheck in 1 month to review results..  - Referral to Gastroenterology  - omeprazole (PRILOSEC) 40 MG delayed-release capsule; Take 1 Capsule by mouth every day.  Dispense: 90 Capsule; Refill: 3    2. Chest pain at rest  Atypical chest pain for angina.  Awaiting stress echo.  If abnormal will refer to cardiology    3. Mild intermittent reactive airway disease without complication      Controlled continue current regimen with as needed albuterol inhaler.  No inhaled steroids

## 2023-12-27 ENCOUNTER — HOSPITAL ENCOUNTER (OUTPATIENT)
Dept: CARDIOLOGY | Facility: MEDICAL CENTER | Age: 63
End: 2023-12-27
Attending: INTERNAL MEDICINE
Payer: COMMERCIAL

## 2023-12-27 DIAGNOSIS — R13.19 ESOPHAGEAL DYSPHAGIA: ICD-10-CM

## 2023-12-27 DIAGNOSIS — R07.9 CHEST PAIN AT REST: ICD-10-CM

## 2023-12-27 DIAGNOSIS — K21.00 GASTROESOPHAGEAL REFLUX DISEASE WITH ESOPHAGITIS WITHOUT HEMORRHAGE: ICD-10-CM

## 2023-12-27 DIAGNOSIS — J98.59 MEDIASTINAL MASS: ICD-10-CM

## 2023-12-27 DIAGNOSIS — K22.2 ESOPHAGEAL OBSTRUCTION: ICD-10-CM

## 2023-12-27 LAB — LV EJECT FRACT  99904: 70

## 2023-12-27 PROCEDURE — 93017 CV STRESS TEST TRACING ONLY: CPT

## 2023-12-27 PROCEDURE — 93350 STRESS TTE ONLY: CPT

## 2023-12-27 PROCEDURE — 93018 CV STRESS TEST I&R ONLY: CPT | Performed by: STUDENT IN AN ORGANIZED HEALTH CARE EDUCATION/TRAINING PROGRAM

## 2023-12-27 PROCEDURE — 93350 STRESS TTE ONLY: CPT | Mod: 26 | Performed by: STUDENT IN AN ORGANIZED HEALTH CARE EDUCATION/TRAINING PROGRAM

## 2023-12-27 PROCEDURE — 93306 TTE W/DOPPLER COMPLETE: CPT | Mod: 26,59 | Performed by: STUDENT IN AN ORGANIZED HEALTH CARE EDUCATION/TRAINING PROGRAM

## 2023-12-27 PROCEDURE — 93306 TTE W/DOPPLER COMPLETE: CPT

## 2024-01-02 ENCOUNTER — APPOINTMENT (OUTPATIENT)
Dept: RADIOLOGY | Facility: MEDICAL CENTER | Age: 64
End: 2024-01-02
Attending: INTERNAL MEDICINE
Payer: COMMERCIAL

## 2024-01-03 DIAGNOSIS — Z01.818 PREOP TESTING: ICD-10-CM

## 2024-01-04 ENCOUNTER — HOSPITAL ENCOUNTER (OUTPATIENT)
Dept: LAB | Facility: MEDICAL CENTER | Age: 64
End: 2024-01-04
Attending: INTERNAL MEDICINE
Payer: COMMERCIAL

## 2024-01-04 ENCOUNTER — HOSPITAL ENCOUNTER (OUTPATIENT)
Dept: RADIOLOGY | Facility: MEDICAL CENTER | Age: 64
End: 2024-01-04
Attending: INTERNAL MEDICINE
Payer: COMMERCIAL

## 2024-01-04 DIAGNOSIS — J98.59 MEDIASTINAL MASS: ICD-10-CM

## 2024-01-04 DIAGNOSIS — Z01.818 PREOP TESTING: ICD-10-CM

## 2024-01-04 LAB
ANION GAP SERPL CALC-SCNC: 14 MMOL/L (ref 7–16)
BUN SERPL-MCNC: 19 MG/DL (ref 8–22)
CALCIUM SERPL-MCNC: 10.2 MG/DL (ref 8.4–10.2)
CHLORIDE SERPL-SCNC: 104 MMOL/L (ref 96–112)
CO2 SERPL-SCNC: 22 MMOL/L (ref 20–33)
CREAT SERPL-MCNC: 0.73 MG/DL (ref 0.5–1.4)
GFR SERPLBLD CREATININE-BSD FMLA CKD-EPI: 92 ML/MIN/1.73 M 2
GLUCOSE SERPL-MCNC: 182 MG/DL (ref 65–99)
POTASSIUM SERPL-SCNC: 4.6 MMOL/L (ref 3.6–5.5)
SODIUM SERPL-SCNC: 140 MMOL/L (ref 135–145)

## 2024-01-04 PROCEDURE — 700117 HCHG RX CONTRAST REV CODE 255: Performed by: INTERNAL MEDICINE

## 2024-01-04 PROCEDURE — 36415 COLL VENOUS BLD VENIPUNCTURE: CPT

## 2024-01-04 PROCEDURE — 80048 BASIC METABOLIC PNL TOTAL CA: CPT

## 2024-01-04 PROCEDURE — 71260 CT THORAX DX C+: CPT

## 2024-01-04 RX ADMIN — IOHEXOL 100 ML: 350 INJECTION, SOLUTION INTRAVENOUS at 16:17

## 2024-01-04 RX ADMIN — IOHEXOL 25 ML: 240 INJECTION, SOLUTION INTRATHECAL; INTRAVASCULAR; INTRAVENOUS; ORAL at 16:18

## 2024-01-12 ENCOUNTER — APPOINTMENT (OUTPATIENT)
Dept: CARDIOLOGY | Facility: MEDICAL CENTER | Age: 64
End: 2024-01-12
Attending: INTERNAL MEDICINE
Payer: COMMERCIAL

## 2024-03-20 ENCOUNTER — OFFICE VISIT (OUTPATIENT)
Dept: MEDICAL GROUP | Age: 64
End: 2024-03-20
Payer: COMMERCIAL

## 2024-03-20 VITALS
HEART RATE: 64 BPM | BODY MASS INDEX: 25.49 KG/M2 | TEMPERATURE: 97.6 F | SYSTOLIC BLOOD PRESSURE: 124 MMHG | OXYGEN SATURATION: 96 % | WEIGHT: 153 LBS | DIASTOLIC BLOOD PRESSURE: 80 MMHG | HEIGHT: 65 IN

## 2024-03-20 DIAGNOSIS — R13.19 ESOPHAGEAL DYSPHAGIA: ICD-10-CM

## 2024-03-20 DIAGNOSIS — E78.2 MIXED HYPERLIPIDEMIA: ICD-10-CM

## 2024-03-20 PROCEDURE — 3074F SYST BP LT 130 MM HG: CPT | Performed by: STUDENT IN AN ORGANIZED HEALTH CARE EDUCATION/TRAINING PROGRAM

## 2024-03-20 PROCEDURE — 3079F DIAST BP 80-89 MM HG: CPT | Performed by: STUDENT IN AN ORGANIZED HEALTH CARE EDUCATION/TRAINING PROGRAM

## 2024-03-20 PROCEDURE — 99214 OFFICE O/P EST MOD 30 MIN: CPT | Performed by: STUDENT IN AN ORGANIZED HEALTH CARE EDUCATION/TRAINING PROGRAM

## 2024-03-20 ASSESSMENT — ENCOUNTER SYMPTOMS
WHEEZING: 0
WEAKNESS: 0
DEPRESSION: 0
SHORTNESS OF BREATH: 0
BRUISES/BLEEDS EASILY: 0
BLOOD IN STOOL: 0
BLURRED VISION: 0
HEARTBURN: 0
PALPITATIONS: 0
FEVER: 0
CHILLS: 0
MYALGIAS: 0
BACK PAIN: 0
DIZZINESS: 0
ABDOMINAL PAIN: 0
COUGH: 0
DOUBLE VISION: 0
HEADACHES: 0

## 2024-03-20 ASSESSMENT — FIBROSIS 4 INDEX: FIB4 SCORE: 0.95

## 2024-03-20 ASSESSMENT — PATIENT HEALTH QUESTIONNAIRE - PHQ9: CLINICAL INTERPRETATION OF PHQ2 SCORE: 0

## 2024-03-20 NOTE — PROGRESS NOTES
"Subjective:     CC: Follow up Dysphagia    HPI:   Ti presents today for follow up for dysphagia.  She has been experiencing this symptoms for over three months she was instructed to take Proliosec and has done extensive testing and imaging and has also been following up with GI.  In January CT scan showed prominent pulmonary vein. EGD no signs of esophagitis, ulcers or gastritis or Schmidt's, she also tested negative for H. Pylori. Patient states that since she made diet changes her symptoms have improved significantly and declined more testing recommended by GI.  Today she tell me that she feels much better and does not want to undergo further testing/imaging and believes that by continuing current diet she will be just fine.     ROS:  Review of Systems   Constitutional:  Negative for chills, fever and malaise/fatigue.   HENT:  Negative for congestion, ear discharge and nosebleeds.    Eyes:  Negative for blurred vision and double vision.   Respiratory:  Negative for cough, shortness of breath and wheezing.    Cardiovascular:  Negative for chest pain, palpitations and leg swelling.   Gastrointestinal:  Negative for abdominal pain, blood in stool, heartburn and melena.   Genitourinary:  Negative for dysuria and urgency.   Musculoskeletal:  Negative for back pain and myalgias.   Skin:  Negative for rash.   Neurological:  Negative for dizziness, weakness and headaches.   Endo/Heme/Allergies:  Does not bruise/bleed easily.   Psychiatric/Behavioral:  Negative for depression and suicidal ideas.        Objective:     Exam:  /80 (BP Location: Right arm, Patient Position: Sitting, BP Cuff Size: Adult)   Pulse 64   Temp 36.4 °C (97.6 °F) (Temporal)   Ht 1.651 m (5' 5\")   Wt 69.4 kg (153 lb)   LMP  (LMP Unknown)   SpO2 96%   BMI 25.46 kg/m²  Body mass index is 25.46 kg/m².    Physical Exam  Vitals reviewed.   Constitutional:       General: She is not in acute distress.  HENT:      Head: Normocephalic and " atraumatic.      Mouth/Throat:      Mouth: Mucous membranes are moist.   Eyes:      General: No scleral icterus.     Extraocular Movements: Extraocular movements intact.      Pupils: Pupils are equal, round, and reactive to light.   Cardiovascular:      Rate and Rhythm: Normal rate and regular rhythm.      Heart sounds: Normal heart sounds. No murmur heard.  Pulmonary:      Effort: No respiratory distress.      Breath sounds: No wheezing.   Abdominal:      General: There is no distension.      Palpations: Abdomen is soft.      Tenderness: There is no abdominal tenderness. There is no guarding or rebound.   Musculoskeletal:      Cervical back: Normal range of motion and neck supple.      Right lower leg: No edema.      Left lower leg: No edema.   Lymphadenopathy:      Cervical: No cervical adenopathy.   Skin:     General: Skin is warm.      Capillary Refill: Capillary refill takes less than 2 seconds.      Coloration: Skin is not jaundiced.   Neurological:      General: No focal deficit present.      Mental Status: She is alert.      Cranial Nerves: No cranial nerve deficit.   Psychiatric:         Mood and Affect: Mood normal.         Behavior: Behavior normal.       Labs: Ordered     Assessment & Plan:     63 y.o. female with the following -     1. Esophageal dysphagia  - Improving  - Underwent extensive work up with GI  - EGD no evidence of mass, esophagitis, gastritis,  - H pylori negative  - Taking Prilosec  - Following with GI  - Declined further testing/imaging  - Symptoms improved significantly with therapy and diet changes    2. Mixed hyperlipidemia  - Chronic, stable  - Not taking Statin  - Repeat labs prior to Annual exam  - Continue healthy diet, regular exercise    - Lipid Profile; Future         Return in about 6 months (around 9/20/2024) for Annual.    Please note that this dictation was created using voice recognition software. I have made every reasonable attempt to correct obvious errors, but I  expect that there are errors of grammar and possibly content that I did not discover before finalizing the note.

## 2024-03-20 NOTE — PATIENT INSTRUCTIONS
- Regular exercise, DASH diet  - Continue home meds  - Labs in 6 months  - Annual exam in 6 months